# Patient Record
Sex: MALE
[De-identification: names, ages, dates, MRNs, and addresses within clinical notes are randomized per-mention and may not be internally consistent; named-entity substitution may affect disease eponyms.]

---

## 2021-01-10 ENCOUNTER — NURSE TRIAGE (OUTPATIENT)
Dept: OTHER | Facility: CLINIC | Age: 61
End: 2021-01-10

## 2021-01-10 NOTE — TELEPHONE ENCOUNTER
Reason for Disposition   Fever present > 3 days (72 hours)    Answer Assessment - Initial Assessment Questions  1. COVID-19 DIAGNOSIS: \"Who made your Coronavirus (COVID-19) diagnosis? \" \"Was it confirmed by a positive lab test?\" If not diagnosed by a HCP, ask \"Are there lots of cases (community spread) where you live? \" (See public health department website, if unsure)      Dx by lab test    2. COVID-19 EXPOSURE: \"Was there any known exposure to COVID before the symptoms began? \" CDC Definition of close contact: within 6 feet (2 meters) for a total of 15 minutes or more over a 24-hour period. Yes    3. ONSET: \"When did the COVID-19 symptoms start?\"       1/1/21    4. WORST SYMPTOM: \"What is your worst symptom? \" (e.g., cough, fever, shortness of breath, muscle aches)      Fever    5. COUGH: \"Do you have a cough? \" If so, ask: \"How bad is the cough? \"        Cough    6. FEVER: \"Do you have a fever? \" If so, ask: \"What is your temperature, how was it measured, and when did it start? \"      100.2. Orally    7. RESPIRATORY STATUS: \"Describe your breathing? \" (e.g., shortness of breath, wheezing, unable to speak)       Denies    8. BETTER-SAME-WORSE: [place] you getting better, staying the same or getting worse compared to yesterday? \"  If getting worse, ask, \"In what way? \"      Better    9. HIGH RISK DISEASE: \"Do you have any chronic medical problems? \" (e.g., asthma, heart or lung disease, weak immune system, obesity, etc.)      copd    10. PREGNANCY: \"Is there any chance you are pregnant? \" \"When was your last menstrual period? \"        N/a    11. OTHER SYMPTOMS: \"Do you have any other symptoms? \"  (e.g., chills, fatigue, headache, loss of smell or taste, muscle pain, sore throat; new loss of smell or taste especially support the diagnosis of COVID-19)        All symptoms have resolved    Protocols used: CORONAVIRUS (COVID-19) DIAGNOSED OR SUSPECTED-ADULT-AH    Call received on [facility] [address]      Brief description of triage: See above    Triage indicates for patient to f/u pcp in call in 24 hours    Care advice provided. Recommended using local department of health website for testing locations and up to date information. Caller verbalizes understanding, denies any other questions or concerns; instructed to call back for any new or worsening symptoms.

## 2022-06-07 ENCOUNTER — APPOINTMENT (OUTPATIENT)
Dept: RADIOLOGY | Facility: HOSPITAL | Age: 62
End: 2022-06-07

## 2022-06-07 ENCOUNTER — HOSPITAL ENCOUNTER (EMERGENCY)
Facility: HOSPITAL | Age: 62
Discharge: HOME | End: 2022-06-08
Attending: EMERGENCY MEDICINE

## 2022-06-07 DIAGNOSIS — I48.91 A-FIB (CMS/HCC): ICD-10-CM

## 2022-06-07 DIAGNOSIS — R07.9 CHEST PAIN: ICD-10-CM

## 2022-06-07 DIAGNOSIS — R42 LIGHTHEADEDNESS: ICD-10-CM

## 2022-06-07 DIAGNOSIS — R73.9 HYPERGLYCEMIA: Primary | ICD-10-CM

## 2022-06-07 DIAGNOSIS — E66.9 OBESITY: ICD-10-CM

## 2022-06-07 LAB
ALBUMIN SERPL-MCNC: 4.3 G/DL (ref 3.5–5.3)
ALP SERPL-CCNC: 101 U/L (ref 45–115)
ALT SERPL-CCNC: 17 U/L (ref 7–52)
ANION GAP SERPL CALC-SCNC: 10 MMOL/L (ref 3–11)
AST SERPL-CCNC: 24 U/L
BASOPHILS # BLD AUTO: 0.1 10*3/UL
BASOPHILS NFR BLD AUTO: 1 % (ref 0–2)
BILIRUB SERPL-MCNC: 0.61 MG/DL (ref 0.2–1.4)
BNP SERPL-MCNC: 43 PG/ML (ref 0–100)
BUN SERPL-MCNC: 21 MG/DL (ref 7–25)
CALCIUM ALBUM COR SERPL-MCNC: 9 MG/DL (ref 8.6–10.3)
CALCIUM SERPL-MCNC: 9.2 MG/DL (ref 8.6–10.3)
CHLORIDE SERPL-SCNC: 102 MMOL/L (ref 98–107)
CO2 SERPL-SCNC: 25 MMOL/L (ref 21–32)
CREAT SERPL-MCNC: 0.93 MG/DL (ref 0.7–1.3)
DELTA HIGH SENSITIVITY TROPONIN I, 2 HOUR: 0.2
EOSINOPHIL # BLD AUTO: 0.1 10*3/UL
EOSINOPHIL NFR BLD AUTO: 1 % (ref 0–3)
ERYTHROCYTE [DISTWIDTH] IN BLOOD BY AUTOMATED COUNT: 13.8 % (ref 11.5–15)
GFR SERPL CREATININE-BSD FRML MDRD: 93 ML/MIN/1.73M*2
GLUCOSE SERPL-MCNC: 128 MG/DL (ref 70–105)
HCT VFR BLD AUTO: 40.8 % (ref 38–50)
HGB BLD-MCNC: 14.2 G/DL (ref 13.2–17.2)
LIPASE SERPL-CCNC: 21 U/L (ref 11–82)
LYMPHOCYTES # BLD AUTO: 2.4 10*3/UL
LYMPHOCYTES NFR BLD AUTO: 30 % (ref 15–47)
MAGNESIUM SERPL-MCNC: 1.8 MG/DL (ref 1.8–2.4)
MCH RBC QN AUTO: 30.3 PG (ref 29–34)
MCHC RBC AUTO-ENTMCNC: 34.7 G/DL (ref 32–36)
MCV RBC AUTO: 87.4 FL (ref 82–97)
MONOCYTES # BLD AUTO: 0.9 10*3/UL
MONOCYTES NFR BLD AUTO: 12 % (ref 5–13)
NEUTROPHILS # BLD AUTO: 4.4 10*3/UL
NEUTROPHILS NFR BLD AUTO: 56 % (ref 46–70)
PLATELET # BLD AUTO: 242 10*3/UL (ref 130–350)
PMV BLD AUTO: 9 FL (ref 6.9–10.8)
POTASSIUM SERPL-SCNC: 3.5 MMOL/L (ref 3.5–5.1)
PROT SERPL-MCNC: 7.6 G/DL (ref 6–8.3)
RBC # BLD AUTO: 4.67 10*6/ΜL (ref 4.1–5.8)
SODIUM SERPL-SCNC: 137 MMOL/L (ref 135–145)
TROPONIN I SERPL-MCNC: 3.6 PG/ML
TROPONIN I SERPL-MCNC: 3.8 PG/ML
TSH SERPL DL<=0.05 MIU/L-ACNC: 1.09 UIU/ML (ref 0.34–4.82)
WBC # BLD AUTO: 7.9 10*3/UL (ref 3.7–9.6)

## 2022-06-07 PROCEDURE — 84443 ASSAY THYROID STIM HORMONE: CPT | Performed by: EMERGENCY MEDICINE

## 2022-06-07 PROCEDURE — 84484 ASSAY OF TROPONIN QUANT: CPT | Performed by: EMERGENCY MEDICINE

## 2022-06-07 PROCEDURE — 83690 ASSAY OF LIPASE: CPT | Performed by: EMERGENCY MEDICINE

## 2022-06-07 PROCEDURE — 83735 ASSAY OF MAGNESIUM: CPT | Performed by: EMERGENCY MEDICINE

## 2022-06-07 PROCEDURE — 85025 COMPLETE CBC W/AUTO DIFF WBC: CPT | Performed by: EMERGENCY MEDICINE

## 2022-06-07 PROCEDURE — 2580000300 HC RX 258: Performed by: EMERGENCY MEDICINE

## 2022-06-07 PROCEDURE — 99284 EMERGENCY DEPT VISIT MOD MDM: CPT | Performed by: EMERGENCY MEDICINE

## 2022-06-07 PROCEDURE — 71045 X-RAY EXAM CHEST 1 VIEW: CPT

## 2022-06-07 PROCEDURE — 93005 ELECTROCARDIOGRAM TRACING: CPT

## 2022-06-07 PROCEDURE — 36415 COLL VENOUS BLD VENIPUNCTURE: CPT | Performed by: EMERGENCY MEDICINE

## 2022-06-07 PROCEDURE — 83880 ASSAY OF NATRIURETIC PEPTIDE: CPT | Performed by: EMERGENCY MEDICINE

## 2022-06-07 PROCEDURE — 80053 COMPREHEN METABOLIC PANEL: CPT | Performed by: EMERGENCY MEDICINE

## 2022-06-07 RX ORDER — SODIUM CHLORIDE 9 MG/ML
500 INJECTION, SOLUTION INTRAVENOUS ONCE
Status: COMPLETED | OUTPATIENT
Start: 2022-06-07 | End: 2022-06-07

## 2022-06-07 RX ADMIN — SODIUM CHLORIDE 500 ML: 9 INJECTION, SOLUTION INTRAVENOUS at 21:56

## 2022-06-08 VITALS
RESPIRATION RATE: 7 BRPM | WEIGHT: 284.83 LBS | TEMPERATURE: 97.9 F | HEART RATE: 64 BPM | HEIGHT: 68 IN | OXYGEN SATURATION: 96 % | BODY MASS INDEX: 43.17 KG/M2 | SYSTOLIC BLOOD PRESSURE: 120 MMHG | DIASTOLIC BLOOD PRESSURE: 77 MMHG

## 2022-06-08 PROCEDURE — 96360 HYDRATION IV INFUSION INIT: CPT

## 2022-06-08 NOTE — DISCHARGE INSTRUCTIONS
Drink plenty of fluids.  Follow-up with your primary care physician upon return home.  If symptoms return, seek medical attention at the nearest hospital.

## 2022-06-08 NOTE — ED PROVIDER NOTES
Chest Pain (Pt with complaints of chest pain while climbing stairs. 4/10 pressure pain in the center of his chest. Hx of cardiac disease, Afib, Gastric bypass 6 months ago. )        HPI:  Patient is a 61 y.o. male presenting to the emergency department with 4/10 in severity central chest pain. His symptoms began while walking at [facility] approximately 7 hours ago. His pain is improving and is intermittent, lasting short intervals. He denies any exacerbating or improving factors. He reports associated lightheadedness but denies any shortness of breath or sweating. Patient has a history of cardiac disease, a-fib, and gastric bypass. The patient is traveling here from [place]        No past medical history on file.    No past surgical history on file.    Social History     Socioeconomic History   • Marital status: Single     Spouse name: Not on file   • Number of children: Not on file   • Years of education: Not on file   • Highest education level: Not on file   Occupational History   • Not on file   Tobacco Use   • Smoking status: Not on file   • Smokeless tobacco: Not on file   Substance and Sexual Activity   • Alcohol use: Not on file   • Drug use: Not on file   • Sexual activity: Not on file   Other Topics Concern   • Not on file   Social History Narrative   • Not on file     Social Determinants of Health     Financial Resource Strain: Not on file   Food Insecurity: Not on file   Transportation Needs: Not on file   Physical Activity: Not on file   Stress: Not on file   Social Connections: Not on file   Intimate Partner Violence: Not on file   Housing Stability: Not on file       No family history on file.    Allergies   Allergen Reactions   • Keflex [Cephalexin] Anaphylaxis       No current outpatient medications on file.      ROS:  Constitutional: negative for fever  Eyes: negative for visual changes  ENT: negative for sore throat  Cardiovascular: positive for chest pain  Respiratory: negative for shortness of  breath   GI: negative for abdominal pain  : negative for hematuria  Musculoskeletal: negative for back pain  Neuro: negative for headache, positive for lightheadedness  Hematology: negative for bleeding  Immunology: negative for joint pain      ED Triage Vitals   Temp Heart Rate Resp BP SpO2   06/07/22 2029 06/07/22 2029 06/07/22 2029 06/07/22 2029 06/07/22 2029   36.6 °C (97.9 °F) 81 18 117/70 96 %      Mean BP (mmHg) Temp Source Heart Rate Source Patient Position BP Location   06/07/22 2300 06/07/22 2029 -- -- --   96 Oral         FiO2 (%)       --                    Physical Exam:  Nursing note and vitals reviewed.  Constitutional:  Obese, mildly anxious appearing, pleasant middle-aged male.    Head: Normocephalic and atraumatic. Mucous membranes are moist.   Eyes: Pupils are equal, round, and reactive to light.   Neck: Supple.  Cardiovascular: Regular rate and rhythm without murmur.   Pulmonary/Chest: No respiratory distress.  Clear to auscultation bilaterally.  Abdominal: Soft and nontender. Obese.    Back: No CVA tenderness. No midline tenderness.   Musculoskeletal: Mild pitting edema  Neurological: Alert.   Skin: Skin is warm and dry. No rash noted.   Psychiatric: Normal mood and affect.        Labs:  Labs Reviewed   COMPREHENSIVE METABOLIC PANEL - Abnormal       Result Value    Sodium 137      Potassium 3.5      Chloride 102      CO2 25      Anion Gap 10      BUN 21      Creatinine 0.93      Glucose 128 (*)     Calcium 9.2      AST 24      ALT (SGPT) 17      Alkaline Phosphatase 101      Total Protein 7.6      Albumin 4.3      Total Bilirubin 0.61      Corrected Calcium 9.0      eGFR 93      Narrative:     Calculation based on the 2021 Chronic Kidney Disease Epidemiology Collaboration (CKD-EPI) equation refit without adjustment for race.   LIPASE - Normal    Lipase 21     MAGNESIUM - Normal    Magnesium 1.8     TSH - Normal    TSH 1.088     B-TYPE NATRIURETIC PEPTIDE (BNP) - Normal    BNP 43     HIGH  SENSITIVE TROPONIN I - Normal    hsTnI 0 Hour 3.6     HIGH SENSITIVE TROPONIN I, 2 HOUR - Normal    Delta from 0 Hour 0.2      hsTnI 2 hr 3.8     HIGH SENSITIVE TROPONIN I PANEL (0HR, 1HR, 2HR)    Narrative:     The following orders were created for panel order HS Troponin I Panel (0HR, 1HR, 2HR) Blood, Venous.  Procedure                               Abnormality         Status                     ---------                               -----------         ------                     HS Troponin I[[id number]]                 Normal              Final result               1HR High Sensitive Trop I[[id number]]                                                    2HR High Sensitive Tropon...[[id number]]  Normal              Final result                 Please view results for these tests on the individual orders.   CBC WITH AUTO DIFFERENTIAL    WBC 7.9      RBC 4.67      Hemoglobin 14.2      Hematocrit 40.8      MCV 87.4      MCH 30.3      MCHC 34.7      RDW 13.8      Platelets 242      MPV 9.0      Neutrophils% 56      Lymphocytes% 30      Monocytes% 12      Eosinophils% 1      Basophils% 1      ANC (auto diff) 4.40      Lymphocytes Absolute 2.40      Monocytes Absolute 0.90      Eosinophils Absolute 0.10      Basophils Absolute 0.10     LAVENDER TOP RAINBOW       Imaging:  XR chest portable 1 view   Final Result   IMPRESSION:   Perihilar opacification could be seen with viral disease or bronchitis.             MDM:    The patient is a 60 yo male with a history of CAD and a-fib presenting with intermittent chest pain over the last 9 hours. An ECG and troponin will be obtained. Baseline labs will be ordered. The patient will be observed.     The patient remained stable. He is having no additional chest pain. His CBC, magnesium, TSH, lipase, and BNP were normal. Multiple troponin's  by an hour were normal. His CMP shows glucose of 128 but is otherwise unremarkable. His chest x-ray questions bronchitis. The patient  complains of no cough and will be discharged home. Reasons to return to the ED were discussed at length. Patient verbalizes understanding of this and will now be discharged.        Given   Medications   sodium chloride 0.9 % bolus 500 mL (0 mL intravenous Stopped 6/7/22 0604)         Procedure    ECG 12 lead, Chest Pain    Date/Time: 6/7/2022 9:36 PM  Performed by: Joseph Fermin MD  Authorized by: Joseph Fermin MD     ECG reviewed by ED Physician in the absence of a cardiologist: yes    Comments:      Sinus rhythm rate of 79. Normal axis Normal intervals. Early r-wave transition. No acute ischemic changes.             Clinical Impression:    Final diagnoses:   [R73.9] Hyperglycemia   [R07.9] Chest pain   [I48.91] A-fib (CMS/HCC) (HCC)   [R42] Lightheadedness   [E66.9] Obesity   By signing my name, I, Tsering Fermin, attest that this documentation has been prepared under the direction and in the presence of Dr. Fermin, 6/8/2022, 12:33 AM.        I, Joseph Fermin MD, personally performed the services described in this documentation.     Joseph Fermin MD  06/08/22 0248

## 2024-02-21 ENCOUNTER — HOSPITAL ENCOUNTER (EMERGENCY)
Facility: HOSPITAL | Age: 64
Discharge: HOME | End: 2024-02-21
Payer: MEDICARE

## 2024-02-21 ENCOUNTER — APPOINTMENT (OUTPATIENT)
Dept: RADIOLOGY | Facility: HOSPITAL | Age: 64
End: 2024-02-21
Payer: MEDICARE

## 2024-02-21 VITALS
HEIGHT: 68 IN | DIASTOLIC BLOOD PRESSURE: 101 MMHG | WEIGHT: 264.55 LBS | RESPIRATION RATE: 16 BRPM | TEMPERATURE: 96.8 F | SYSTOLIC BLOOD PRESSURE: 187 MMHG | BODY MASS INDEX: 40.09 KG/M2 | OXYGEN SATURATION: 99 % | HEART RATE: 78 BPM

## 2024-02-21 DIAGNOSIS — R10.9 ABDOMINAL PAIN, UNSPECIFIED ABDOMINAL LOCATION: Primary | ICD-10-CM

## 2024-02-21 LAB
ALBUMIN SERPL BCP-MCNC: 4.3 G/DL (ref 3.4–5)
ALP SERPL-CCNC: 87 U/L (ref 33–136)
ALT SERPL W P-5'-P-CCNC: 13 U/L (ref 10–52)
ANION GAP SERPL CALC-SCNC: 14 MMOL/L (ref 10–20)
APPEARANCE UR: CLEAR
AST SERPL W P-5'-P-CCNC: 30 U/L (ref 9–39)
BASOPHILS # BLD AUTO: 0.04 X10*3/UL (ref 0–0.1)
BASOPHILS NFR BLD AUTO: 0.7 %
BILIRUB DIRECT SERPL-MCNC: 0.1 MG/DL (ref 0–0.3)
BILIRUB SERPL-MCNC: 1 MG/DL (ref 0–1.2)
BILIRUB UR STRIP.AUTO-MCNC: NEGATIVE MG/DL
BUN SERPL-MCNC: 15 MG/DL (ref 6–23)
CALCIUM SERPL-MCNC: 9 MG/DL (ref 8.6–10.3)
CHLORIDE SERPL-SCNC: 102 MMOL/L (ref 98–107)
CO2 SERPL-SCNC: 23 MMOL/L (ref 21–32)
COLOR UR: YELLOW
CREAT SERPL-MCNC: 0.68 MG/DL (ref 0.5–1.3)
EGFRCR SERPLBLD CKD-EPI 2021: >90 ML/MIN/1.73M*2
EOSINOPHIL # BLD AUTO: 0.11 X10*3/UL (ref 0–0.7)
EOSINOPHIL NFR BLD AUTO: 1.9 %
ERYTHROCYTE [DISTWIDTH] IN BLOOD BY AUTOMATED COUNT: 12.3 % (ref 11.5–14.5)
GLUCOSE SERPL-MCNC: 92 MG/DL (ref 74–99)
GLUCOSE UR STRIP.AUTO-MCNC: NEGATIVE MG/DL
HCT VFR BLD AUTO: 47.7 % (ref 41–52)
HGB BLD-MCNC: 16.2 G/DL (ref 13.5–17.5)
HOLD SPECIMEN: NORMAL
IMM GRANULOCYTES # BLD AUTO: 0.03 X10*3/UL (ref 0–0.7)
IMM GRANULOCYTES NFR BLD AUTO: 0.5 % (ref 0–0.9)
KETONES UR STRIP.AUTO-MCNC: ABNORMAL MG/DL
LACTATE SERPL-SCNC: 1 MMOL/L (ref 0.4–2)
LEUKOCYTE ESTERASE UR QL STRIP.AUTO: NEGATIVE
LIPASE SERPL-CCNC: 16 U/L (ref 9–82)
LYMPHOCYTES # BLD AUTO: 1.33 X10*3/UL (ref 1.2–4.8)
LYMPHOCYTES NFR BLD AUTO: 22.9 %
MAGNESIUM SERPL-MCNC: 2.05 MG/DL (ref 1.6–2.4)
MCH RBC QN AUTO: 30.6 PG (ref 26–34)
MCHC RBC AUTO-ENTMCNC: 34 G/DL (ref 32–36)
MCV RBC AUTO: 90 FL (ref 80–100)
MONOCYTES # BLD AUTO: 0.61 X10*3/UL (ref 0.1–1)
MONOCYTES NFR BLD AUTO: 10.5 %
NEUTROPHILS # BLD AUTO: 3.69 X10*3/UL (ref 1.2–7.7)
NEUTROPHILS NFR BLD AUTO: 63.5 %
NITRITE UR QL STRIP.AUTO: NEGATIVE
NRBC BLD-RTO: 0 /100 WBCS (ref 0–0)
PH UR STRIP.AUTO: 7 [PH]
PLATELET # BLD AUTO: 237 X10*3/UL (ref 150–450)
POTASSIUM SERPL-SCNC: 4.9 MMOL/L (ref 3.5–5.3)
POTASSIUM SERPL-SCNC: 6.5 MMOL/L (ref 3.5–5.3)
PROT SERPL-MCNC: 7.7 G/DL (ref 6.4–8.2)
PROT UR STRIP.AUTO-MCNC: NEGATIVE MG/DL
RBC # BLD AUTO: 5.3 X10*6/UL (ref 4.5–5.9)
RBC # UR STRIP.AUTO: NEGATIVE /UL
SODIUM SERPL-SCNC: 132 MMOL/L (ref 136–145)
SP GR UR STRIP.AUTO: 1.02
UROBILINOGEN UR STRIP.AUTO-MCNC: 4 MG/DL
WBC # BLD AUTO: 5.8 X10*3/UL (ref 4.4–11.3)

## 2024-02-21 PROCEDURE — 2500000004 HC RX 250 GENERAL PHARMACY W/ HCPCS (ALT 636 FOR OP/ED): Performed by: PHYSICIAN ASSISTANT

## 2024-02-21 PROCEDURE — 36415 COLL VENOUS BLD VENIPUNCTURE: CPT | Performed by: PHYSICIAN ASSISTANT

## 2024-02-21 PROCEDURE — 74177 CT ABD & PELVIS W/CONTRAST: CPT

## 2024-02-21 PROCEDURE — 83735 ASSAY OF MAGNESIUM: CPT | Performed by: PHYSICIAN ASSISTANT

## 2024-02-21 PROCEDURE — 84132 ASSAY OF SERUM POTASSIUM: CPT | Mod: 59 | Performed by: PHYSICIAN ASSISTANT

## 2024-02-21 PROCEDURE — 74177 CT ABD & PELVIS W/CONTRAST: CPT | Performed by: RADIOLOGY

## 2024-02-21 PROCEDURE — 99285 EMERGENCY DEPT VISIT HI MDM: CPT | Mod: 25

## 2024-02-21 PROCEDURE — 83690 ASSAY OF LIPASE: CPT | Performed by: PHYSICIAN ASSISTANT

## 2024-02-21 PROCEDURE — 2550000001 HC RX 255 CONTRASTS: Performed by: PHYSICIAN ASSISTANT

## 2024-02-21 PROCEDURE — 93975 VASCULAR STUDY: CPT

## 2024-02-21 PROCEDURE — 84075 ASSAY ALKALINE PHOSPHATASE: CPT | Performed by: PHYSICIAN ASSISTANT

## 2024-02-21 PROCEDURE — 81003 URINALYSIS AUTO W/O SCOPE: CPT | Performed by: PHYSICIAN ASSISTANT

## 2024-02-21 PROCEDURE — 96374 THER/PROPH/DIAG INJ IV PUSH: CPT | Mod: 59

## 2024-02-21 PROCEDURE — 83605 ASSAY OF LACTIC ACID: CPT | Performed by: PHYSICIAN ASSISTANT

## 2024-02-21 PROCEDURE — 80048 BASIC METABOLIC PNL TOTAL CA: CPT | Performed by: PHYSICIAN ASSISTANT

## 2024-02-21 PROCEDURE — 85025 COMPLETE CBC W/AUTO DIFF WBC: CPT | Performed by: PHYSICIAN ASSISTANT

## 2024-02-21 RX ORDER — IBUPROFEN 600 MG/1
600 TABLET ORAL EVERY 6 HOURS PRN
Qty: 28 TABLET | Refills: 0 | Status: SHIPPED | OUTPATIENT
Start: 2024-02-21 | End: 2024-02-28

## 2024-02-21 RX ORDER — KETOROLAC TROMETHAMINE 15 MG/ML
15 INJECTION, SOLUTION INTRAMUSCULAR; INTRAVENOUS ONCE
Status: COMPLETED | OUTPATIENT
Start: 2024-02-21 | End: 2024-02-21

## 2024-02-21 RX ADMIN — IOHEXOL 75 ML: 350 INJECTION, SOLUTION INTRAVENOUS at 12:09

## 2024-02-21 RX ADMIN — KETOROLAC TROMETHAMINE 15 MG: 15 INJECTION, SOLUTION INTRAMUSCULAR; INTRAVENOUS at 10:50

## 2024-02-21 ASSESSMENT — LIFESTYLE VARIABLES
EVER FELT BAD OR GUILTY ABOUT YOUR DRINKING: NO
HAVE YOU EVER FELT YOU SHOULD CUT DOWN ON YOUR DRINKING: NO
EVER HAD A DRINK FIRST THING IN THE MORNING TO STEADY YOUR NERVES TO GET RID OF A HANGOVER: NO
HAVE PEOPLE ANNOYED YOU BY CRITICIZING YOUR DRINKING: NO

## 2024-02-21 ASSESSMENT — COLUMBIA-SUICIDE SEVERITY RATING SCALE - C-SSRS
2. HAVE YOU ACTUALLY HAD ANY THOUGHTS OF KILLING YOURSELF?: NO
1. IN THE PAST MONTH, HAVE YOU WISHED YOU WERE DEAD OR WISHED YOU COULD GO TO SLEEP AND NOT WAKE UP?: NO
6. HAVE YOU EVER DONE ANYTHING, STARTED TO DO ANYTHING, OR PREPARED TO DO ANYTHING TO END YOUR LIFE?: NO

## 2024-02-21 ASSESSMENT — PAIN DESCRIPTION - LOCATION: LOCATION: ABDOMEN

## 2024-02-21 ASSESSMENT — PAIN SCALES - GENERAL: PAINLEVEL_OUTOF10: 3

## 2024-02-21 ASSESSMENT — PAIN - FUNCTIONAL ASSESSMENT: PAIN_FUNCTIONAL_ASSESSMENT: 0-10

## 2024-02-21 NOTE — ED PROVIDER NOTES
HPI   Chief Complaint   Patient presents with    Abdominal Pain     Pt c/o abd pain that's starts around his Rt flank and goes all the way across his abd and down to his groin.       Is a 63-year-old male coming in for a few days of right flank pain.  He states it radiates down to the anterior abdomen and then down into the left testicle.  Patient denies any prior symptoms similar to this.  He denies any aggravating or alleviating factors.  He does have a history of Arleen-en-Y procedure but denies any other abdominal surgeries.  Patient has not had any fevers or chills.  He denies any hematemesis or blood in his stool.  Patient states that he feels a pressure to the lower abdomen.  He denies any urinary symptoms including hematuria or dysuria.      History provided by:  Patient                      [place] Coma Scale Score: 15                     Patient History   No past medical history on file.  No past surgical history on file.  No family history on file.  Social History     Tobacco Use    Smoking status: Not on file    Smokeless tobacco: Not on file   Substance Use Topics    Alcohol use: Not on file    Drug use: Not on file       Physical Exam   ED Triage Vitals [02/21/24 1017]   Temperature Heart Rate Respirations BP   36 °C (96.8 °F) 78 16 (!) 187/101      Pulse Ox Temp src Heart Rate Source Patient Position   99 % -- -- --      BP Location FiO2 (%)     -- --       Physical Exam  Vitals and nursing note reviewed.   Constitutional:       General: He is not in acute distress.     Appearance: Normal appearance. He is not toxic-appearing.   HENT:      Head: Normocephalic and atraumatic.      Nose: Nose normal.      Mouth/Throat:      Mouth: Mucous membranes are moist.      Pharynx: Oropharynx is clear.   Eyes:      Extraocular Movements: Extraocular movements intact.      Conjunctiva/sclera: Conjunctivae normal.      Pupils: Pupils are equal, round, and reactive to light.   Cardiovascular:      Rate and Rhythm:  Regular rhythm.      Pulses: Normal pulses.      Heart sounds: Normal heart sounds.   Pulmonary:      Effort: Pulmonary effort is normal. No respiratory distress.      Breath sounds: Normal breath sounds.   Abdominal:      General: Abdomen is flat. Bowel sounds are normal.      Palpations: Abdomen is soft.      Tenderness: There is abdominal tenderness in the right lower quadrant and left lower quadrant.   Musculoskeletal:         General: Normal range of motion.      Cervical back: Normal range of motion and neck supple.   Skin:     General: Skin is warm and dry.      Coloration: Skin is not jaundiced or pale.      Findings: No bruising.   Neurological:      General: No focal deficit present.      Mental Status: He is alert and oriented to person, place, and time. Mental status is at baseline.   Psychiatric:         Mood and Affect: Mood normal.         Behavior: Behavior normal.         ED Course & MDM   Diagnoses as of 02/21/24 1253   Abdominal pain, unspecified abdominal location       Medical Decision Making  Summary:  Medical Decision Making:   Patient presented as described in HPI. Patient case including ROS, PE, and treatment and plan discussed with ED attending if attached as cosigner. Results from labs and or imaging included below if completed. Koko Winchester  is a 63 y.o. coming in for Patient presents with:  Abdominal Pain: Pt c/o abd pain that's starts around his Rt flank and goes all the way across his abd and down to his groin.  .  Due to patient's complaint ultrasound of the testicles were completed as well as CT abdomen pelvis as well as labs.  Lab work originally did show hypokalemia that was hemolyzed.  Redraw was normal.  No other concerning lab abnormalities.  CT showed no other acute concerning findings for patient's current symptoms.  He does have a hydrocele noted on ultrasound however no torsion or other concerns.  He does feel much better.  Patient will be discharged home with ibuprofen and  advised to use for his discomfort as well as following up with a PCP for rather evaluation and assessment.      Disposition is completed with shared decision making with the patient or guardian present with the patient. They were advised to follow up with PCP or recommended provider in 2-3 days for another evaluation and exam. I advised the patient to return or go to closest emergency room immediately if symptoms change, get worse, or new symptoms develop prior to follow up. I explained the plan and treatment course. Patient/guardian is in agreement with plan, treatment course, and follow up and state that they will comply.    Labs Reviewed  BASIC METABOLIC PANEL - Abnormal     Glucose                       92                     Sodium                        132 (*)                Potassium                     6.5 (*)                Chloride                      102                    Bicarbonate                   23                     Anion Gap                     14                     Urea Nitrogen                 15                     Creatinine                    0.68                   eGFR                          >90                    Calcium                       9.0                 URINALYSIS WITH REFLEX CULTURE AND MICROSCOPIC - Abnormal     Color, Urine                  Yellow                 Appearance, Urine             Clear                  Specific Gravity, Urine       1.023                  pH, Urine                     7.0                    Protein, Urine                NEGATIVE                Glucose, Urine                NEGATIVE                Blood, Urine                  NEGATIVE                Ketones, Urine                5 (TRACE) (*)               Bilirubin, Urine              NEGATIVE                Urobilinogen, Urine           4.0 (*)                Nitrite, Urine                NEGATIVE                Leukocyte Esterase, Urine     NEGATIVE             MAGNESIUM - Normal      Magnesium                     2.05                LIPASE - Normal     Lipase                        16                       Narrative: Venipuncture immediately after or during the administration of Metamizole may lead to falsely low results. Testing should be performed immediately prior to Metamizole dosing.  HEPATIC FUNCTION PANEL - Normal     Albumin                       4.3                    Bilirubin, Total              1.0                    Bilirubin, Direct             0.1                    Alkaline Phosphatase          87                     ALT                           13                     AST                           30                     Total Protein                 7.7                 LACTATE - Normal     Lactate                       1.0                      Narrative: Venipuncture immediately after or during the administration of Metamizole may lead to falsely low results. Testing should be performed immediately                prior to Metamizole dosing.  POTASSIUM - Normal     Potassium                     4.9                 CBC WITH AUTO DIFFERENTIAL     WBC                           5.8                    nRBC                          0.0                    RBC                           5.30                   Hemoglobin                    16.2                   Hematocrit                    47.7                   MCV                           90                     MCH                           30.6                   MCHC                          34.0                   RDW                           12.3                   Platelets                     237                    Neutrophils %                 63.5                   Immature Granulocytes %, Automated   0.5                    Lymphocytes %                 22.9                   Monocytes %                   10.5                   Eosinophils %                 1.9                    Basophils %                   0.7                     Neutrophils Absolute          3.69                   Immature Granulocytes Absolute, Au*   0.03                   Lymphocytes Absolute          1.33                   Monocytes Absolute            0.61                   Eosinophils Absolute          0.11                   Basophils Absolute            0.04                URINALYSIS WITH REFLEX CULTURE AND MICROSCOPIC       Narrative: The following orders were created for panel order Urinalysis with Reflex Culture and Microscopic.                Procedure                               Abnormality         Status                                   ---------                               -----------         ------                                   Urinalysis with Reflex Cu...[[id number]]  Abnormal            Final result                             Extra Urine Gray Tube[[id number]]                             In process                                               Please view results for these tests on the individual orders.  EXTRA URINE GRAY TUBE   CT abdomen pelvis w IV contrast   Final Result    No acute findings of the abdomen or pelvis.          MACRO:    None.          Signed by: Sergio Mckeon 2/21/2024 12:30 PM    Dictation workstation:   [id number]     US scrotum w doppler   Final Result    1. No acute scrotal pathology.    2. Small left-sided hydrocele with internal debris likely chronic.          MACRO:    None          Signed by: Kishore Dejesus 2/21/2024 11:25 AM    Dictation workstation:   [id number]         Tests/Medications/Escalations of Care considered but not given: As in MDM    Patient care discussed with: N/A  Social Determinants affecting care: N/A    Final diagnosis and disposition as documented     Diagnoses as of 02/21/24 1253  Abdominal pain, unspecified abdominal location       Shared decision making was completed and determined that patient will be discharged. I discussed the differential; results and discharge plan with the patient and/or  family/friend/caregiver if present.  I emphasized the importance of follow-up with the physician I referred them to in the timeframe recommended.  I explained reasons for the patient to return to the Emergency Department. They agreed that if they feel their condition is worsening or if they have any other concern they should call 911 immediately for further assistance. I gave the patient an opportunity to ask all questions they had and answered all of them accordingly. They understand return precautions and discharge instructions. The patient and/or family/friend/caregiver expressed understanding verbally and that they would comply.     Disposition: Discharge      This note has been transcribed using voice recognition and may contain grammatical errors, misplaced words, incorrect words, incorrect phrases or other errors.         Procedure  Procedures     Aureliano Mendez PA-C  02/21/24 1254       Aureliano Mendez PA-C  03/06/24 1744

## 2024-02-21 NOTE — ED TRIAGE NOTES
Pt c/o abd pain that's starts around his Rt flank and goes all the way across his abd and down to his groin.

## 2024-05-05 ENCOUNTER — APPOINTMENT (OUTPATIENT)
Dept: CARDIOLOGY | Facility: HOSPITAL | Age: 64
End: 2024-05-05
Payer: MEDICARE

## 2024-05-05 ENCOUNTER — APPOINTMENT (OUTPATIENT)
Dept: RADIOLOGY | Facility: HOSPITAL | Age: 64
End: 2024-05-05
Payer: MEDICARE

## 2024-05-05 ENCOUNTER — HOSPITAL ENCOUNTER (OUTPATIENT)
Facility: HOSPITAL | Age: 64
Setting detail: OBSERVATION
Discharge: HOME | End: 2024-05-06
Attending: STUDENT IN AN ORGANIZED HEALTH CARE EDUCATION/TRAINING PROGRAM | Admitting: INTERNAL MEDICINE
Payer: MEDICARE

## 2024-05-05 DIAGNOSIS — I20.0 ANGINA PECTORIS, UNSTABLE (MULTI): ICD-10-CM

## 2024-05-05 DIAGNOSIS — E11.69 TYPE 2 DIABETES MELLITUS WITH OTHER SPECIFIED COMPLICATION, UNSPECIFIED WHETHER LONG TERM INSULIN USE (MULTI): ICD-10-CM

## 2024-05-05 DIAGNOSIS — R07.9 CHEST PAIN: ICD-10-CM

## 2024-05-05 DIAGNOSIS — I15.9 SECONDARY HYPERTENSION: ICD-10-CM

## 2024-05-05 DIAGNOSIS — R07.89 ATYPICAL CHEST PAIN: ICD-10-CM

## 2024-05-05 DIAGNOSIS — I20.9 ANGINA PECTORIS, UNSPECIFIED (CMS-HCC): ICD-10-CM

## 2024-05-05 DIAGNOSIS — R07.9 CHEST PAIN, UNSPECIFIED TYPE: Primary | ICD-10-CM

## 2024-05-05 LAB
ALBUMIN SERPL BCP-MCNC: 5 G/DL (ref 3.4–5)
ALP SERPL-CCNC: 103 U/L (ref 33–136)
ALT SERPL W P-5'-P-CCNC: 14 U/L (ref 10–52)
ANION GAP SERPL CALC-SCNC: 14 MMOL/L (ref 10–20)
ANION GAP SERPL CALC-SCNC: 17 MMOL/L (ref 10–20)
AST SERPL W P-5'-P-CCNC: 19 U/L (ref 9–39)
BASOPHILS # BLD AUTO: 0.07 X10*3/UL (ref 0–0.1)
BASOPHILS NFR BLD AUTO: 0.8 %
BILIRUB SERPL-MCNC: 1 MG/DL (ref 0–1.2)
BUN SERPL-MCNC: 17 MG/DL (ref 6–23)
BUN SERPL-MCNC: 18 MG/DL (ref 6–23)
CALCIUM SERPL-MCNC: 9.1 MG/DL (ref 8.6–10.3)
CALCIUM SERPL-MCNC: 9.7 MG/DL (ref 8.6–10.3)
CARDIAC TROPONIN I PNL SERPL HS: 4 NG/L (ref 0–20)
CARDIAC TROPONIN I PNL SERPL HS: 5 NG/L (ref 0–20)
CARDIAC TROPONIN I PNL SERPL HS: 7 NG/L (ref 0–20)
CHLORIDE SERPL-SCNC: 101 MMOL/L (ref 98–107)
CHLORIDE SERPL-SCNC: 102 MMOL/L (ref 98–107)
CHOLEST SERPL-MCNC: 115 MG/DL (ref 0–199)
CHOLESTEROL/HDL RATIO: 2.7
CO2 SERPL-SCNC: 25 MMOL/L (ref 21–32)
CO2 SERPL-SCNC: 27 MMOL/L (ref 21–32)
CREAT SERPL-MCNC: 0.87 MG/DL (ref 0.5–1.3)
CREAT SERPL-MCNC: 0.96 MG/DL (ref 0.5–1.3)
EGFRCR SERPLBLD CKD-EPI 2021: 88 ML/MIN/1.73M*2
EGFRCR SERPLBLD CKD-EPI 2021: >90 ML/MIN/1.73M*2
EOSINOPHIL # BLD AUTO: 0.26 X10*3/UL (ref 0–0.7)
EOSINOPHIL NFR BLD AUTO: 2.9 %
ERYTHROCYTE [DISTWIDTH] IN BLOOD BY AUTOMATED COUNT: 12.7 % (ref 11.5–14.5)
GLUCOSE BLD MANUAL STRIP-MCNC: 103 MG/DL (ref 74–99)
GLUCOSE BLD MANUAL STRIP-MCNC: 103 MG/DL (ref 74–99)
GLUCOSE BLD MANUAL STRIP-MCNC: 108 MG/DL (ref 74–99)
GLUCOSE BLD MANUAL STRIP-MCNC: 123 MG/DL (ref 74–99)
GLUCOSE SERPL-MCNC: 115 MG/DL (ref 74–99)
GLUCOSE SERPL-MCNC: 97 MG/DL (ref 74–99)
HCT VFR BLD AUTO: 47.6 % (ref 41–52)
HDLC SERPL-MCNC: 42 MG/DL
HGB BLD-MCNC: 16.2 G/DL (ref 13.5–17.5)
HOLD SPECIMEN: NORMAL
IMM GRANULOCYTES # BLD AUTO: 0.03 X10*3/UL (ref 0–0.7)
IMM GRANULOCYTES NFR BLD AUTO: 0.3 % (ref 0–0.9)
LDLC SERPL CALC-MCNC: 64 MG/DL
LYMPHOCYTES # BLD AUTO: 3.64 X10*3/UL (ref 1.2–4.8)
LYMPHOCYTES NFR BLD AUTO: 40.6 %
MAGNESIUM SERPL-MCNC: 2.09 MG/DL (ref 1.6–2.4)
MCH RBC QN AUTO: 30.8 PG (ref 26–34)
MCHC RBC AUTO-ENTMCNC: 34 G/DL (ref 32–36)
MCV RBC AUTO: 91 FL (ref 80–100)
MONOCYTES # BLD AUTO: 1.02 X10*3/UL (ref 0.1–1)
MONOCYTES NFR BLD AUTO: 11.4 %
NEUTROPHILS # BLD AUTO: 3.95 X10*3/UL (ref 1.2–7.7)
NEUTROPHILS NFR BLD AUTO: 44 %
NON HDL CHOLESTEROL: 73 MG/DL (ref 0–149)
NRBC BLD-RTO: 0 /100 WBCS (ref 0–0)
PLATELET # BLD AUTO: 266 X10*3/UL (ref 150–450)
PMV BLD AUTO: 9.8 FL (ref 7.5–11.5)
POTASSIUM SERPL-SCNC: 3.4 MMOL/L (ref 3.5–5.3)
POTASSIUM SERPL-SCNC: 4.2 MMOL/L (ref 3.5–5.3)
PROT SERPL-MCNC: 8.4 G/DL (ref 6.4–8.2)
RBC # BLD AUTO: 5.26 X10*6/UL (ref 4.5–5.9)
SODIUM SERPL-SCNC: 139 MMOL/L (ref 136–145)
SODIUM SERPL-SCNC: 140 MMOL/L (ref 136–145)
TRIGL SERPL-MCNC: 43 MG/DL (ref 0–149)
VLDL: 9 MG/DL (ref 0–40)
WBC # BLD AUTO: 9 X10*3/UL (ref 4.4–11.3)

## 2024-05-05 PROCEDURE — 2500000001 HC RX 250 WO HCPCS SELF ADMINISTERED DRUGS (ALT 637 FOR MEDICARE OP): Performed by: NURSE PRACTITIONER

## 2024-05-05 PROCEDURE — 36415 COLL VENOUS BLD VENIPUNCTURE: CPT | Performed by: STUDENT IN AN ORGANIZED HEALTH CARE EDUCATION/TRAINING PROGRAM

## 2024-05-05 PROCEDURE — 82947 ASSAY GLUCOSE BLOOD QUANT: CPT

## 2024-05-05 PROCEDURE — 96372 THER/PROPH/DIAG INJ SC/IM: CPT | Performed by: NURSE PRACTITIONER

## 2024-05-05 PROCEDURE — 84484 ASSAY OF TROPONIN QUANT: CPT | Performed by: NURSE PRACTITIONER

## 2024-05-05 PROCEDURE — 80048 BASIC METABOLIC PNL TOTAL CA: CPT | Mod: CCI | Performed by: STUDENT IN AN ORGANIZED HEALTH CARE EDUCATION/TRAINING PROGRAM

## 2024-05-05 PROCEDURE — 2500000006 HC RX 250 W HCPCS SELF ADMINISTERED DRUGS (ALT 637 FOR ALL PAYERS): Performed by: STUDENT IN AN ORGANIZED HEALTH CARE EDUCATION/TRAINING PROGRAM

## 2024-05-05 PROCEDURE — 84075 ASSAY ALKALINE PHOSPHATASE: CPT | Performed by: STUDENT IN AN ORGANIZED HEALTH CARE EDUCATION/TRAINING PROGRAM

## 2024-05-05 PROCEDURE — 71045 X-RAY EXAM CHEST 1 VIEW: CPT

## 2024-05-05 PROCEDURE — G0378 HOSPITAL OBSERVATION PER HR: HCPCS

## 2024-05-05 PROCEDURE — 2500000004 HC RX 250 GENERAL PHARMACY W/ HCPCS (ALT 636 FOR OP/ED): Performed by: NURSE PRACTITIONER

## 2024-05-05 PROCEDURE — 85025 COMPLETE CBC W/AUTO DIFF WBC: CPT | Performed by: STUDENT IN AN ORGANIZED HEALTH CARE EDUCATION/TRAINING PROGRAM

## 2024-05-05 PROCEDURE — 99223 1ST HOSP IP/OBS HIGH 75: CPT | Performed by: INTERNAL MEDICINE

## 2024-05-05 PROCEDURE — 71045 X-RAY EXAM CHEST 1 VIEW: CPT | Mod: FOREIGN READ | Performed by: RADIOLOGY

## 2024-05-05 PROCEDURE — 2500000001 HC RX 250 WO HCPCS SELF ADMINISTERED DRUGS (ALT 637 FOR MEDICARE OP): Performed by: STUDENT IN AN ORGANIZED HEALTH CARE EDUCATION/TRAINING PROGRAM

## 2024-05-05 PROCEDURE — 93005 ELECTROCARDIOGRAM TRACING: CPT

## 2024-05-05 PROCEDURE — 99223 1ST HOSP IP/OBS HIGH 75: CPT | Performed by: NURSE PRACTITIONER

## 2024-05-05 PROCEDURE — 84484 ASSAY OF TROPONIN QUANT: CPT | Performed by: STUDENT IN AN ORGANIZED HEALTH CARE EDUCATION/TRAINING PROGRAM

## 2024-05-05 PROCEDURE — 2500000005 HC RX 250 GENERAL PHARMACY W/O HCPCS: Performed by: NURSE PRACTITIONER

## 2024-05-05 PROCEDURE — 94660 CPAP INITIATION&MGMT: CPT

## 2024-05-05 PROCEDURE — 83735 ASSAY OF MAGNESIUM: CPT | Performed by: STUDENT IN AN ORGANIZED HEALTH CARE EDUCATION/TRAINING PROGRAM

## 2024-05-05 PROCEDURE — 99285 EMERGENCY DEPT VISIT HI MDM: CPT | Mod: 25

## 2024-05-05 PROCEDURE — 80061 LIPID PANEL: CPT | Performed by: NURSE PRACTITIONER

## 2024-05-05 RX ORDER — LISINOPRIL 20 MG/1
20 TABLET ORAL DAILY
Status: DISCONTINUED | OUTPATIENT
Start: 2024-05-05 | End: 2024-05-06 | Stop reason: HOSPADM

## 2024-05-05 RX ORDER — ATORVASTATIN CALCIUM 80 MG/1
80 TABLET, FILM COATED ORAL DAILY
Status: DISCONTINUED | OUTPATIENT
Start: 2024-05-05 | End: 2024-05-06 | Stop reason: HOSPADM

## 2024-05-05 RX ORDER — INSULIN LISPRO 100 [IU]/ML
0-10 INJECTION, SOLUTION INTRAVENOUS; SUBCUTANEOUS
Status: DISCONTINUED | OUTPATIENT
Start: 2024-05-05 | End: 2024-05-06 | Stop reason: HOSPADM

## 2024-05-05 RX ORDER — NITROGLYCERIN 0.4 MG/1
TABLET SUBLINGUAL
Status: DISPENSED
Start: 2024-05-05 | End: 2024-05-05

## 2024-05-05 RX ORDER — PAROXETINE HYDROCHLORIDE 20 MG/1
20 TABLET, FILM COATED ORAL EVERY MORNING
Status: DISCONTINUED | OUTPATIENT
Start: 2024-05-05 | End: 2024-05-06 | Stop reason: HOSPADM

## 2024-05-05 RX ORDER — FUROSEMIDE 40 MG/1
40 TABLET ORAL 2 TIMES DAILY
COMMUNITY

## 2024-05-05 RX ORDER — METOPROLOL TARTRATE 25 MG/1
25 TABLET, FILM COATED ORAL 2 TIMES DAILY
Status: DISCONTINUED | OUTPATIENT
Start: 2024-05-05 | End: 2024-05-06 | Stop reason: HOSPADM

## 2024-05-05 RX ORDER — SPIRONOLACTONE 25 MG/1
25 TABLET ORAL DAILY
Status: DISCONTINUED | OUTPATIENT
Start: 2024-05-05 | End: 2024-05-06 | Stop reason: HOSPADM

## 2024-05-05 RX ORDER — ATORVASTATIN CALCIUM 80 MG/1
80 TABLET, FILM COATED ORAL NIGHTLY
Status: DISCONTINUED | OUTPATIENT
Start: 2024-05-05 | End: 2024-05-06 | Stop reason: HOSPADM

## 2024-05-05 RX ORDER — METOPROLOL TARTRATE 25 MG/1
25 TABLET, FILM COATED ORAL 2 TIMES DAILY
COMMUNITY
End: 2024-05-16 | Stop reason: WASHOUT

## 2024-05-05 RX ORDER — FUROSEMIDE 40 MG/1
40 TABLET ORAL 2 TIMES DAILY
Status: DISCONTINUED | OUTPATIENT
Start: 2024-05-05 | End: 2024-05-06 | Stop reason: HOSPADM

## 2024-05-05 RX ORDER — METFORMIN HYDROCHLORIDE 500 MG/1
500 TABLET ORAL
Status: DISCONTINUED | OUTPATIENT
Start: 2024-05-05 | End: 2024-05-06 | Stop reason: HOSPADM

## 2024-05-05 RX ORDER — MORPHINE SULFATE 2 MG/ML
1 INJECTION, SOLUTION INTRAMUSCULAR; INTRAVENOUS EVERY 4 HOURS PRN
Status: DISCONTINUED | OUTPATIENT
Start: 2024-05-05 | End: 2024-05-06 | Stop reason: HOSPADM

## 2024-05-05 RX ORDER — DEXTROSE 50 % IN WATER (D50W) INTRAVENOUS SYRINGE
25
Status: DISCONTINUED | OUTPATIENT
Start: 2024-05-05 | End: 2024-05-06 | Stop reason: HOSPADM

## 2024-05-05 RX ORDER — POTASSIUM CHLORIDE 1.5 G/1.58G
20 POWDER, FOR SOLUTION ORAL 2 TIMES DAILY
Status: DISCONTINUED | OUTPATIENT
Start: 2024-05-05 | End: 2024-05-06 | Stop reason: HOSPADM

## 2024-05-05 RX ORDER — METFORMIN HYDROCHLORIDE 500 MG/1
500 TABLET ORAL
Status: ON HOLD | COMMUNITY
End: 2024-05-06

## 2024-05-05 RX ORDER — PAROXETINE HYDROCHLORIDE 20 MG/1
20 TABLET, FILM COATED ORAL EVERY MORNING
COMMUNITY

## 2024-05-05 RX ORDER — DEXTROSE 50 % IN WATER (D50W) INTRAVENOUS SYRINGE
12.5
Status: DISCONTINUED | OUTPATIENT
Start: 2024-05-05 | End: 2024-05-06 | Stop reason: HOSPADM

## 2024-05-05 RX ORDER — ATORVASTATIN CALCIUM 80 MG/1
80 TABLET, FILM COATED ORAL NIGHTLY
COMMUNITY

## 2024-05-05 RX ORDER — MORPHINE SULFATE 2 MG/ML
2 INJECTION, SOLUTION INTRAMUSCULAR; INTRAVENOUS EVERY 4 HOURS PRN
Status: DISCONTINUED | OUTPATIENT
Start: 2024-05-05 | End: 2024-05-06 | Stop reason: HOSPADM

## 2024-05-05 RX ORDER — NITROGLYCERIN 0.4 MG/1
0.4 TABLET SUBLINGUAL ONCE
Status: COMPLETED | OUTPATIENT
Start: 2024-05-05 | End: 2024-05-05

## 2024-05-05 RX ORDER — ASPIRIN 81 MG/1
81 TABLET ORAL DAILY
COMMUNITY

## 2024-05-05 RX ORDER — SPIRONOLACTONE 25 MG/1
25 TABLET ORAL DAILY
COMMUNITY
End: 2024-05-16 | Stop reason: SDUPTHER

## 2024-05-05 RX ORDER — LISINOPRIL 20 MG/1
20 TABLET ORAL DAILY
Status: ON HOLD | COMMUNITY
End: 2024-05-06

## 2024-05-05 RX ORDER — NAPROXEN SODIUM 220 MG/1
324 TABLET, FILM COATED ORAL ONCE
Status: COMPLETED | OUTPATIENT
Start: 2024-05-05 | End: 2024-05-05

## 2024-05-05 RX ORDER — NAPROXEN SODIUM 220 MG/1
81 TABLET, FILM COATED ORAL DAILY
Status: DISCONTINUED | OUTPATIENT
Start: 2024-05-05 | End: 2024-05-06 | Stop reason: HOSPADM

## 2024-05-05 RX ORDER — NAPROXEN SODIUM 220 MG/1
TABLET, FILM COATED ORAL
Status: DISPENSED
Start: 2024-05-05 | End: 2024-05-05

## 2024-05-05 RX ORDER — ENOXAPARIN SODIUM 100 MG/ML
40 INJECTION SUBCUTANEOUS EVERY 12 HOURS SCHEDULED
Status: DISCONTINUED | OUTPATIENT
Start: 2024-05-05 | End: 2024-05-06

## 2024-05-05 RX ADMIN — METOPROLOL TARTRATE 25 MG: 25 TABLET, FILM COATED ORAL at 13:35

## 2024-05-05 RX ADMIN — LISINOPRIL 20 MG: 20 TABLET ORAL at 13:35

## 2024-05-05 RX ADMIN — FUROSEMIDE 40 MG: 40 TABLET ORAL at 13:35

## 2024-05-05 RX ADMIN — ASPIRIN 81 MG 81 MG: 81 TABLET ORAL at 08:51

## 2024-05-05 RX ADMIN — POTASSIUM CHLORIDE 20 MEQ: 1.5 POWDER, FOR SOLUTION ORAL at 04:48

## 2024-05-05 RX ADMIN — ENOXAPARIN SODIUM 40 MG: 40 INJECTION SUBCUTANEOUS at 21:12

## 2024-05-05 RX ADMIN — ATORVASTATIN CALCIUM 80 MG: 80 TABLET, FILM COATED ORAL at 21:12

## 2024-05-05 RX ADMIN — METOPROLOL TARTRATE 25 MG: 25 TABLET, FILM COATED ORAL at 21:12

## 2024-05-05 RX ADMIN — NITROGLYCERIN 0.4 MG: 0.4 TABLET SUBLINGUAL at 02:56

## 2024-05-05 RX ADMIN — Medication 1 L/MIN: at 06:30

## 2024-05-05 RX ADMIN — Medication 1 L/MIN: at 08:00

## 2024-05-05 RX ADMIN — SPIRONOLACTONE 25 MG: 25 TABLET ORAL at 13:35

## 2024-05-05 RX ADMIN — ENOXAPARIN SODIUM 40 MG: 40 INJECTION SUBCUTANEOUS at 08:51

## 2024-05-05 RX ADMIN — POTASSIUM CHLORIDE 20 MEQ: 1.5 POWDER, FOR SOLUTION ORAL at 21:12

## 2024-05-05 RX ADMIN — PAROXETINE 20 MG: 20 TABLET, FILM COATED ORAL at 13:35

## 2024-05-05 RX ADMIN — ASPIRIN 81 MG 324 MG: 81 TABLET ORAL at 02:56

## 2024-05-05 RX ADMIN — FUROSEMIDE 40 MG: 40 TABLET ORAL at 21:12

## 2024-05-05 SDOH — SOCIAL STABILITY: SOCIAL INSECURITY: ARE THERE ANY APPARENT SIGNS OF INJURIES/BEHAVIORS THAT COULD BE RELATED TO ABUSE/NEGLECT?: NO

## 2024-05-05 SDOH — SOCIAL STABILITY: SOCIAL INSECURITY: DOES ANYONE TRY TO KEEP YOU FROM HAVING/CONTACTING OTHER FRIENDS OR DOING THINGS OUTSIDE YOUR HOME?: NO

## 2024-05-05 SDOH — SOCIAL STABILITY: SOCIAL INSECURITY: HAS ANYONE EVER THREATENED TO HURT YOUR FAMILY OR YOUR PETS?: NO

## 2024-05-05 SDOH — ECONOMIC STABILITY: HOUSING INSECURITY
IN THE LAST 12 MONTHS, WAS THERE A TIME WHEN YOU DID NOT HAVE A STEADY PLACE TO SLEEP OR SLEPT IN A SHELTER (INCLUDING NOW)?: NO

## 2024-05-05 SDOH — SOCIAL STABILITY: SOCIAL INSECURITY: ARE YOU OR HAVE YOU BEEN THREATENED OR ABUSED PHYSICALLY, EMOTIONALLY, OR SEXUALLY BY ANYONE?: NO

## 2024-05-05 SDOH — SOCIAL STABILITY: SOCIAL INSECURITY: DO YOU FEEL UNSAFE GOING BACK TO THE PLACE WHERE YOU ARE LIVING?: NO

## 2024-05-05 SDOH — SOCIAL STABILITY: SOCIAL INSECURITY: HAVE YOU HAD THOUGHTS OF HARMING ANYONE ELSE?: NO

## 2024-05-05 SDOH — SOCIAL STABILITY: SOCIAL INSECURITY: HAVE YOU HAD ANY THOUGHTS OF HARMING ANYONE ELSE?: NO

## 2024-05-05 SDOH — SOCIAL STABILITY: SOCIAL INSECURITY: DO YOU FEEL ANYONE HAS EXPLOITED OR TAKEN ADVANTAGE OF YOU FINANCIALLY OR OF YOUR PERSONAL PROPERTY?: NO

## 2024-05-05 SDOH — SOCIAL STABILITY: SOCIAL INSECURITY: ABUSE: ADULT

## 2024-05-05 SDOH — ECONOMIC STABILITY: TRANSPORTATION INSECURITY
IN THE PAST 12 MONTHS, HAS THE LACK OF TRANSPORTATION KEPT YOU FROM MEDICAL APPOINTMENTS OR FROM GETTING MEDICATIONS?: NO

## 2024-05-05 SDOH — ECONOMIC STABILITY: INCOME INSECURITY: IN THE LAST 12 MONTHS, WAS THERE A TIME WHEN YOU WERE NOT ABLE TO PAY THE MORTGAGE OR RENT ON TIME?: NO

## 2024-05-05 SDOH — ECONOMIC STABILITY: HOUSING INSECURITY: IN THE LAST 12 MONTHS, HOW MANY PLACES HAVE YOU LIVED?: 1

## 2024-05-05 SDOH — SOCIAL STABILITY: SOCIAL INSECURITY: WERE YOU ABLE TO COMPLETE ALL THE BEHAVIORAL HEALTH SCREENINGS?: YES

## 2024-05-05 SDOH — ECONOMIC STABILITY: TRANSPORTATION INSECURITY
IN THE PAST 12 MONTHS, HAS LACK OF TRANSPORTATION KEPT YOU FROM MEETINGS, WORK, OR FROM GETTING THINGS NEEDED FOR DAILY LIVING?: NO

## 2024-05-05 SDOH — ECONOMIC STABILITY: INCOME INSECURITY: HOW HARD IS IT FOR YOU TO PAY FOR THE VERY BASICS LIKE FOOD, HOUSING, MEDICAL CARE, AND HEATING?: NOT HARD AT ALL

## 2024-05-05 ASSESSMENT — ENCOUNTER SYMPTOMS
EYES NEGATIVE: 1
MUSCULOSKELETAL NEGATIVE: 1
HEMATOLOGIC/LYMPHATIC NEGATIVE: 1
GASTROINTESTINAL NEGATIVE: 1
PSYCHIATRIC NEGATIVE: 1
CONSTITUTIONAL NEGATIVE: 1
NEUROLOGICAL NEGATIVE: 1
ENDOCRINE COMMENTS: PT IS DIABETIC
ALLERGIC/IMMUNOLOGIC NEGATIVE: 1

## 2024-05-05 ASSESSMENT — COGNITIVE AND FUNCTIONAL STATUS - GENERAL
DAILY ACTIVITIY SCORE: 24
MOBILITY SCORE: 24
DAILY ACTIVITIY SCORE: 24
PATIENT BASELINE BEDBOUND: NO
MOBILITY SCORE: 24

## 2024-05-05 ASSESSMENT — PATIENT HEALTH QUESTIONNAIRE - PHQ9
1. LITTLE INTEREST OR PLEASURE IN DOING THINGS: NOT AT ALL
1. LITTLE INTEREST OR PLEASURE IN DOING THINGS: NOT AT ALL
2. FEELING DOWN, DEPRESSED OR HOPELESS: NOT AT ALL
2. FEELING DOWN, DEPRESSED OR HOPELESS: NOT AT ALL
SUM OF ALL RESPONSES TO PHQ9 QUESTIONS 1 & 2: 0
SUM OF ALL RESPONSES TO PHQ9 QUESTIONS 1 & 2: 0

## 2024-05-05 ASSESSMENT — ACTIVITIES OF DAILY LIVING (ADL)
HEARING - RIGHT EAR: FUNCTIONAL
JUDGMENT_ADEQUATE_SAFELY_COMPLETE_DAILY_ACTIVITIES: YES
FEEDING YOURSELF: INDEPENDENT
GROOMING: INDEPENDENT
HEARING - LEFT EAR: FUNCTIONAL
DRESSING YOURSELF: INDEPENDENT
WALKS IN HOME: INDEPENDENT
PATIENT'S MEMORY ADEQUATE TO SAFELY COMPLETE DAILY ACTIVITIES?: YES
ADEQUATE_TO_COMPLETE_ADL: YES
TOILETING: INDEPENDENT
BATHING: INDEPENDENT

## 2024-05-05 ASSESSMENT — LIFESTYLE VARIABLES
TOTAL SCORE: 0
SKIP TO QUESTIONS 9-10: 0
PRESCIPTION_ABUSE_PAST_12_MONTHS: NO
HAVE YOU EVER FELT YOU SHOULD CUT DOWN ON YOUR DRINKING: NO
AUDIT-C TOTAL SCORE: 3
HOW OFTEN DO YOU HAVE 6 OR MORE DRINKS ON ONE OCCASION: LESS THAN MONTHLY
SUBSTANCE_ABUSE_PAST_12_MONTHS: NO
HOW MANY STANDARD DRINKS CONTAINING ALCOHOL DO YOU HAVE ON A TYPICAL DAY: 3 OR 4
EVER FELT BAD OR GUILTY ABOUT YOUR DRINKING: NO
EVER HAD A DRINK FIRST THING IN THE MORNING TO STEADY YOUR NERVES TO GET RID OF A HANGOVER: NO
HOW OFTEN DO YOU HAVE A DRINK CONTAINING ALCOHOL: MONTHLY OR LESS
HAVE PEOPLE ANNOYED YOU BY CRITICIZING YOUR DRINKING: NO
AUDIT-C TOTAL SCORE: 3

## 2024-05-05 ASSESSMENT — PAIN - FUNCTIONAL ASSESSMENT
PAIN_FUNCTIONAL_ASSESSMENT: 0-10
PAIN_FUNCTIONAL_ASSESSMENT: 0-10

## 2024-05-05 ASSESSMENT — COLUMBIA-SUICIDE SEVERITY RATING SCALE - C-SSRS
6. HAVE YOU EVER DONE ANYTHING, STARTED TO DO ANYTHING, OR PREPARED TO DO ANYTHING TO END YOUR LIFE?: NO
1. IN THE PAST MONTH, HAVE YOU WISHED YOU WERE DEAD OR WISHED YOU COULD GO TO SLEEP AND NOT WAKE UP?: NO
2. HAVE YOU ACTUALLY HAD ANY THOUGHTS OF KILLING YOURSELF?: NO

## 2024-05-05 ASSESSMENT — PAIN DESCRIPTION - DESCRIPTORS: DESCRIPTORS: PRESSURE

## 2024-05-05 ASSESSMENT — PAIN SCALES - GENERAL
PAINLEVEL_OUTOF10: 0 - NO PAIN
PAINLEVEL_OUTOF10: 5 - MODERATE PAIN

## 2024-05-05 NOTE — ED PROVIDER NOTES
CC: Chest Pain     HPI:  Patient is a 63-year-old male significant history of atrial fibrillation on anticoagulation as well as heart failure with an EF of 45% on last echocardiogram in 2023 presenting the emergency department chest pain.  At 2 AM it woke him from sleep.  Describes as a mid chest pressure.  Does not radiate.  Causes severe pain.  Patient feels like he is having a heart attack.  Patient continues to say he felt like this was going to be the 1 and thought he was going to die.    Records Reviewed:  Recent available ED and inpatient notes reviewed in EMR.    PMHx/PSHx:  Per HPI.   - has no past medical history on file.  - has no past surgical history on file.  - does not have a problem list on file.    Medications:  Reviewed in EMR. See EMR for complete list of medications and doses.    Allergies:  Keflex [cephalexin]    Social History:  - Tobacco:  reports that he has never smoked. He has never used smokeless tobacco.   - Alcohol:  reports no history of alcohol use.   - Illicit Drugs:  reports no history of drug use.     ROS:  Per HPI.       ???????????????????????????????????????????????????????????????  Triage Vitals:  T 36.2 °C (97.2 °F)  HR 80  BP (!) 172/95  RR 19  O2 100 % None (Room air)    Physical Exam  ???????????????????????????????????????????????????????????????  GEN: in acute distress  HEAD: atraumatic  EYES: PERRL, EOMI  NECK: no JVD  CVS/CHEST: reg rate, nl rhythm  PULM: CTA b/l no wheezes, crackles, or rhonchi   GI: soft, NT/ND, no rebound or guarding   EXT: Mild bilateral LE edema, 2+ periph pulses in bilat radial and DP   NEURO: Awake and alert, moving all extremities equally and spontaneously  SKIN: warm, dry  PSYCH: Anxious    EKG:  Initial EKG at 0238 read by me reviewed by me is normal sinus rhythm at 83 bpm.  Normal axis.  Normal intervals.  Nonspecific ST segment changes without criteria for ST segment elevation.  Repeat EKG is normal sinus rhythm at 78 bpm.  Normal axis.   Normal intervals.  No ST segment elevation or depression.  Follow-up EKG is normal sinus rhythm at 63 bpm.  Normal axis.  Normal intervals.  No ST segment elevation or depression.    Assessment and Plan:  Patient presents to the emergency department with chest pain.  On arrival, patient is initially hemodynamically stable.  Initial EKG is nonischemic.  Given the character of patient's chest pain patient given 324 mg of chewable aspirin.  Patient has a moderate risk heart score. Serial EKGs and troponins will be obtained given the concern for acute coronary syndrome.  Based on patient's history and physical, I do have a lower suspicion for other etiologies of chest pain including aortic dissection, given the character of the pain with equal peripheral pulses in upper and lower extremities.  I do have a lower suspicion for pulmonary embolism given lack of risk factors, no clinical signs of DVT, normal heart rate and is saturating appropriately on room air.  EKG without signs of pericarditis. On exam patient does have bilateral breath sounds.  Low suspicion for pneumothorax.  No history of vomiting and therefore low suspicion for esophageal rupture.  Patient's exam and history does appear concerning for acute coronary syndrome.  Pending initial work-up patient will be offered observation for provocative testing, serial labs and cardiology consultation.  Patient amenable to admission.      ED Course:  Diagnoses as of 05/05/24 0438   Chest pain, unspecified type       Social Determinants Limiting Care:  Insurance issues previously limited access to care.    Disposition:  Admitted     Chika Medina DO      Procedures ? SmartLinks last updated 5/5/2024 4:26 AM        Chika Medina DO  05/05/24 0448

## 2024-05-05 NOTE — CONSULTS
"Consults  History Of Present Illness:    Koko Winchester is a 63 y.o. 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  States last night he woke from sleep at 2am with severe midsternal chest pressure-- felt like an elephant sitting on his chest with associated SOB.  Checked his BP was 185/100,  and decided to go to the ER.  Has felt \"off\" and fatigued recently with chest pain intermittently for 8 months or more.  Troponin 4, 5. BP in /95-->157/98.  Reports compliance with medications.      Last Recorded Vitals:  Vitals:    05/05/24 0236 05/05/24 0423 05/05/24 0600 05/05/24 0700   BP: (!) 172/95 (!) 121/92 146/90 (!) 157/98   BP Location:    Right arm   Patient Position:    Lying   Pulse: 80 66 71 70   Resp: 19 17 18 16   Temp: 36.2 °C (97.2 °F)   36.1 °C (97 °F)   TempSrc: Temporal   Temporal   SpO2: 100% 99% 96% 97%   Weight: 120 kg (265 lb)   121 kg (266 lb 8.6 oz)   Height: 1.727 m (5' 8\")   1.727 m (5' 8\")       Last Labs:  CBC - 5/5/2024:  2:49 AM  9.0 16.2 266    47.6      CMP - 5/5/2024:  2:49 AM  9.7 8.4 19 --- 1.0   _ 5.0 14 103      PTT - No results in last year.  _   _ _     Troponin I, High Sensitivity   Date/Time Value Ref Range Status   05/05/2024 07:21 AM 7 0 - 20 ng/L Final   05/05/2024 03:54 AM 5 0 - 20 ng/L Final   05/05/2024 02:49 AM 4 0 - 20 ng/L Final     Hemoglobin A1C   Date/Time Value Ref Range Status   02/07/2023 05:07 AM 5.2 % Final     Comment:          Diagnosis of Diabetes-Adults   Non-Diabetic: < or = 5.6%   Increased risk for developing diabetes: 5.7-6.4%   Diagnostic of diabetes: > or = 6.5%  .       Monitoring of Diabetes                Age (y)     Therapeutic Goal (%)   Adults:          >18           <7.0   Pediatrics:    13-18           <7.5                   7-12           <8.0                   0- 6            7.5-8.5   American Diabetes " "Association. Diabetes Care 33(S1), Jan 2010.       LDL Calculated   Date/Time Value Ref Range Status   05/05/2024 03:54 AM 64 <=99 mg/dL Final     Comment:                                 Near   Borderline      AGE      Desirable  Optimal    High     High     Very High     0-19 Y     0 - 109     ---    110-129   >/= 130     ----    20-24 Y     0 - 119     ---    120-159   >/= 160     ----      >24 Y     0 -  99   100-129  130-159   160-189     >/=190       VLDL   Date/Time Value Ref Range Status   05/05/2024 03:54 AM 9 0 - 40 mg/dL Final   02/07/2023 05:07 AM 11 0 - 40 mg/dL Final      Last I/O:  No intake/output data recorded.    Past Cardiology Tests (Last 3 Years):  EKG:  NSR, HR 63bpm    Echo:  6/12/2023  CONCLUSIONS:  1. Left ventricular systolic function is mildly decreased.  2. The right atrium is mild to moderately dilated.  3. There is global hypokinesis of the left ventricle with minor regional variations.     2/7/2023  CONCLUSIONS:   1. Left ventricular systolic function is normal with a 55-60% estimated ejection fraction.   2. Spectral Doppler shows an impaired relaxation pattern of left ventricular diastolic filling.   3. There is trace-mild mitral, tricuspid and pulmonic regurgitation.   4. The estimated pulmonary artery pressure is mildly elevated with the RVSP at 37.2 mmHg.    Cath:  Per patient-- C ~2020 at OSH with \"40% blockages\" no h/o PCI    Stress Test:  2/21/2023  Summary:  1. No clinical or electrocardiographic evidence for ischemia at a maximal workload.  2. Nuclear image results are reported separately.  3. The adequate level of stress was achieved.      Past Medical History:  He has no past medical history on file.    Past Surgical History:  He has no past surgical history on file.      Social History:  He reports that he has never smoked. He has never used smokeless tobacco. He reports that he does not drink alcohol and does not use drugs.    Family History:  No family history on " file.     Allergies:  Keflex [cephalexin]    Inpatient Medications:  Scheduled medications   Medication Dose Route Frequency    aspirin  81 mg oral Daily    atorvastatin  80 mg oral Daily    enoxaparin  40 mg subcutaneous q12h NETTA    insulin lispro  0-10 Units subcutaneous TID with meals    [Held by provider] metFORMIN  500 mg oral BID with meals    potassium chloride  20 mEq oral BID     PRN medications   Medication    dextrose    dextrose    glucagon    glucagon    nitroglycerin     Continuous Medications   Medication Dose Last Rate     Outpatient Medications:  No current outpatient medications    Physical Exam:  Constitutional: Pleasant, Awake/Alert/Oriented to person place and time. Anxious   Head: Atraumatic, Normocephalic  Eyes: EOMI. PAULINE  Neck: Enlarged neck circumference, No JVD  Cardiovascular: Regular rate and rhythm, S1, S2. No extra heart sounds or murmurs  Respiratory: Clear to auscultation bilaterally. No wheezing, rales or rhonchi. Good chest wall expansion  Abdomen: Soft, Nontender, Obese. Bowel sounds appreciated  Musculoskeletal: ROM intact. Muscle strength grossly intact upper and lower extremities 5/5.   Neurological: CNII-XII intact. Sensation grossly intact  Extremities: Warm and dry. No acute rashes and lesions  Psychiatric: Appropriate mood and affect       Assessment/Plan   Very pleasant 63 y.o. 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      Assessment:  Unstable angina  Hypertensive urgency    Plan:  -NPO after midnight for LHC  -Likely will need antihypertensive dose adjustment.  Home med rec is pending RN completion.    -Agree with aspirin and HI statin therapy. Patient states he takes lisinopril, metoprolol, spironolactone and lasix BID, however, dosing is unclear.   -Hold apixaban prior to LHC and will look to resume  post cath    Peripheral IV 05/05/24 18 G Distal;Left;Upper;Anterior Arm (Active)   Site Assessment Dry;Intact;Clean 05/05/24 0856   Dressing Type Transparent 05/05/24 0856   Line Status Flushed 05/05/24 0856   Dressing Status Clean;Dry 05/05/24 0856   Number of days: 0       Code Status:  Full Code      Jade Clemens, APRLV-CNP

## 2024-05-05 NOTE — PROGRESS NOTES
05/05/24 1039   Discharge Planning   Living Arrangements Family members  (cousin)   Support Systems Family members   Assistance Needed A&Ox3, independent with ADLs, no DME, drives, room air at baseline; no PCP   Type of Residence Private residence   Home or Post Acute Services None   Patient expects to be discharged to: Home no needs   Does the patient need discharge transport arranged? No     05/05/2024 1040: Spoke to the patient at the bedside. Patient denies any discharge needs and prefers to return home when medically ready.

## 2024-05-05 NOTE — PROGRESS NOTES
Same day rounding:  Still having intermittent chest pressure, troponins negative.  Seen by cardiology, planning left heart cath tomorrow.  Holding metformin and apixaban.  Resumed home cardiac meds.

## 2024-05-05 NOTE — H&P
History Of Present Illness  Koko Winchester is a 63 y.o. male with history of non critical CAD, HTN, HLD, DM type II and heart failure presenting with chest pressure. He says he woke up at around 2 AM with substernal chest pressure radiating to left lateral area of his chest. He felt slightly short of breath but denied having lightheadedness, diaphoresis, palpitations, nausea or vomiting. He drove himself to the ED. His EKG on arrival did not reveal any acute ischemic changes. He received NTG, ASA in ED with relief of his chest discomfort. His serial troponins have been negative so far.      Past Medical History  Type 2 diabetes mellitus  Noncritical coronary artery disease  Hypertension  Hyperlipidemia  Obstructive sleep apnea on nocturnal CPAP  Anxiety  COPD  Pulmonary hypertension  Obesity s/p gastric bypass surgery  Heart failure  Atrial fibrillation    Past Surgical History  Arleen-en-Y gastric bypass surgery     Social History  He reports that he has never smoked. He has never used smokeless tobacco. He reports that he does not drink alcohol and does not use drugs.    Family History  Father: COPD  Mother Emphysema  Brother: Sudden cardiac death  Sister: CAD s/p PCI with stenting and CABG     Allergies  Keflex [cephalexin]    Review of Systems   Constitutional: Negative.    HENT: Negative.     Eyes: Negative.    Respiratory:          See HPI   Cardiovascular:         See HPI   Gastrointestinal: Negative.    Endocrine:        Pt is diabetic    Genitourinary: Negative.    Musculoskeletal: Negative.    Skin: Negative.    Allergic/Immunologic: Negative.    Neurological: Negative.    Hematological: Negative.    Psychiatric/Behavioral: Negative.          Physical Exam  Constitutional:       General: He is not in acute distress.     Appearance: He is obese. He is not ill-appearing, toxic-appearing or diaphoretic.   HENT:      Head: Normocephalic and atraumatic.      Nose: Nose normal.      Mouth/Throat:      Mouth: Mucous  "membranes are moist.      Pharynx: Oropharynx is clear. No oropharyngeal exudate or posterior oropharyngeal erythema.   Eyes:      General: No scleral icterus.        Right eye: No discharge.         Left eye: No discharge.      Conjunctiva/sclera: Conjunctivae normal.   Cardiovascular:      Rate and Rhythm: Normal rate and regular rhythm.      Heart sounds: No murmur heard.  Pulmonary:      Breath sounds: No wheezing, rhonchi or rales.   Abdominal:      General: Bowel sounds are normal.      Palpations: Abdomen is soft.      Tenderness: There is no abdominal tenderness. There is no right CVA tenderness, left CVA tenderness, guarding or rebound.   Musculoskeletal:      Cervical back: Neck supple.      Right lower leg: No edema.      Left lower leg: No edema.   Lymphadenopathy:      Cervical: No cervical adenopathy.   Skin:     General: Skin is warm and dry.      Findings: No lesion or rash.   Neurological:      General: No focal deficit present.      Mental Status: He is alert and oriented to person, place, and time.   Psychiatric:         Mood and Affect: Mood normal.         Behavior: Behavior normal.          Last Recorded Vitals  Blood pressure (!) 121/92, pulse 66, temperature 36.2 °C (97.2 °F), temperature source Temporal, resp. rate 17, height 1.727 m (5' 8\"), weight 120 kg (265 lb), SpO2 99%.    Relevant Results   Latest Reference Range & Units 05/05/24 02:49 05/05/24 03:54   GLUCOSE 74 - 99 mg/dL 115 (H)    SODIUM 136 - 145 mmol/L 140    POTASSIUM 3.5 - 5.3 mmol/L 3.4 (L)    CHLORIDE 98 - 107 mmol/L 101    Bicarbonate 21 - 32 mmol/L 25    Anion Gap 10 - 20 mmol/L 17    Blood Urea Nitrogen 6 - 23 mg/dL 18    Creatinine 0.50 - 1.30 mg/dL 0.96    EGFR >60 mL/min/1.73m*2 88    Calcium 8.6 - 10.3 mg/dL 9.7    Albumin 3.4 - 5.0 g/dL 5.0    Alkaline Phosphatase 33 - 136 U/L 103    ALT 10 - 52 U/L 14    AST 9 - 39 U/L 19    Bilirubin Total 0.0 - 1.2 mg/dL 1.0    Total Protein 6.4 - 8.2 g/dL 8.4 (H)    MAGNESIUM " 1.60 - 2.40 mg/dL 2.09    Troponin I, High Sensitivity 0 - 20 ng/L 4 5   LEUKOCYTES (10*3/UL) IN BLOOD BY AUTOMATED COUNT, Turkish 4.4 - 11.3 x10*3/uL 9.0    nRBC 0.0 - 0.0 /100 WBCs 0.0    ERYTHROCYTES (10*6/UL) IN BLOOD BY AUTOMATED COUNT, Turkish 4.50 - 5.90 x10*6/uL 5.26    HEMOGLOBIN 13.5 - 17.5 g/dL 16.2    HEMATOCRIT 41.0 - 52.0 % 47.6    MCV 80 - 100 fL 91    MCH 26.0 - 34.0 pg 30.8    MCHC 32.0 - 36.0 g/dL 34.0    RED CELL DISTRIBUTION WIDTH 11.5 - 14.5 % 12.7    PLATELETS (10*3/UL) IN BLOOD AUTOMATED COUNT, Turkish 150 - 450 x10*3/uL 266    MEAN PLATELET VOLUME 7.5 - 11.5 fL 9.8    NEUTROPHILS/100 LEUKOCYTES IN BLOOD BY AUTOMATED COUNT, Turkish 40.0 - 80.0 % 44.0    Immature Granulocytes %, Automated 0.0 - 0.9 % 0.3    Lymphocytes % 13.0 - 44.0 % 40.6    Monocytes % 2.0 - 10.0 % 11.4    Eosinophils % 0.0 - 6.0 % 2.9    Basophils % 0.0 - 2.0 % 0.8    NEUTROPHILS (10*3/UL) IN BLOOD BY AUTOMATED COUNT, Turkish 1.20 - 7.70 x10*3/uL 3.95    Immature Granulocytes Absolute, Automated 0.00 - 0.70 x10*3/uL 0.03    Lymphocytes Absolute 1.20 - 4.80 x10*3/uL 3.64    Monocytes Absolute 0.10 - 1.00 x10*3/uL 1.02 (H)    Eosinophils Absolute 0.00 - 0.70 x10*3/uL 0.26    Basophils Absolute 0.00 - 0.10 x10*3/uL 0.07    (H): Data is abnormally high  (L): Data is abnormally low    XR CHEST:     IMPRESSION:  No acute cardiopulmonary process.  Signed by Alex Bruno MD     Assessment/Plan   Chest pain  Has non critical CAD from cardiac cath >1 year ago but has multiple risk factors  Observation to telemetry  Serial troponin levels  Antiplatelet and HI statin therapy  Cardiology to see     Hypokalemia  Replaced in ED  Recheck     Type II DM  Resume metformin  Accu checks and will add ISS coverage as needed    CAITLIN  On nocturnal CPAP 8 cm at night    Hyperlipidemia  Check FLP  HI statin      I spent 60 minutes in the professional and overall care of this patient.      Dami Bedolla MD

## 2024-05-05 NOTE — CARE PLAN
Problem: Pain  Goal: Takes deep breaths with improved pain control throughout the shift  Outcome: Progressing  Goal: Walks with improved pain control throughout the shift  Outcome: Progressing   The patient's goals for the shift include      The clinical goals for the shift include Decrease chest pain

## 2024-05-05 NOTE — CARE PLAN
The patient's goals for the shift include      The clinical goals for the shift include Decrease chest pain

## 2024-05-06 ENCOUNTER — APPOINTMENT (OUTPATIENT)
Dept: CARDIOLOGY | Facility: HOSPITAL | Age: 64
End: 2024-05-06
Payer: MEDICARE

## 2024-05-06 VITALS
WEIGHT: 266.54 LBS | HEIGHT: 68 IN | SYSTOLIC BLOOD PRESSURE: 115 MMHG | DIASTOLIC BLOOD PRESSURE: 75 MMHG | TEMPERATURE: 97.9 F | RESPIRATION RATE: 18 BRPM | HEART RATE: 65 BPM | OXYGEN SATURATION: 97 % | BODY MASS INDEX: 40.4 KG/M2

## 2024-05-06 DIAGNOSIS — R00.2 PALPITATIONS: Primary | ICD-10-CM

## 2024-05-06 PROBLEM — R07.9 CHEST PAIN: Status: RESOLVED | Noted: 2024-05-05 | Resolved: 2024-05-06

## 2024-05-06 PROBLEM — I20.0 ANGINA PECTORIS, UNSTABLE (MULTI): Status: RESOLVED | Noted: 2024-05-05 | Resolved: 2024-05-06

## 2024-05-06 LAB
ALBUMIN SERPL BCP-MCNC: 4.2 G/DL (ref 3.4–5)
ANION GAP SERPL CALC-SCNC: 11 MMOL/L (ref 10–20)
BNP SERPL-MCNC: 82 PG/ML (ref 0–99)
BUN SERPL-MCNC: 16 MG/DL (ref 6–23)
CALCIUM SERPL-MCNC: 9.1 MG/DL (ref 8.6–10.3)
CARDIAC TROPONIN I PNL SERPL HS: 5 NG/L (ref 0–20)
CHLORIDE SERPL-SCNC: 101 MMOL/L (ref 98–107)
CO2 SERPL-SCNC: 29 MMOL/L (ref 21–32)
CREAT SERPL-MCNC: 0.96 MG/DL (ref 0.5–1.3)
EGFRCR SERPLBLD CKD-EPI 2021: 88 ML/MIN/1.73M*2
ERYTHROCYTE [DISTWIDTH] IN BLOOD BY AUTOMATED COUNT: 12.5 % (ref 11.5–14.5)
GLUCOSE SERPL-MCNC: 96 MG/DL (ref 74–99)
HCT VFR BLD AUTO: 47.1 % (ref 41–52)
HGB BLD-MCNC: 15.3 G/DL (ref 13.5–17.5)
MAGNESIUM SERPL-MCNC: 1.92 MG/DL (ref 1.6–2.4)
MCH RBC QN AUTO: 30.4 PG (ref 26–34)
MCHC RBC AUTO-ENTMCNC: 32.5 G/DL (ref 32–36)
MCV RBC AUTO: 94 FL (ref 80–100)
NRBC BLD-RTO: 0 /100 WBCS (ref 0–0)
PHOSPHATE SERPL-MCNC: 4.2 MG/DL (ref 2.5–4.9)
PLATELET # BLD AUTO: 235 X10*3/UL (ref 150–450)
POTASSIUM SERPL-SCNC: 4.1 MMOL/L (ref 3.5–5.3)
RBC # BLD AUTO: 5.04 X10*6/UL (ref 4.5–5.9)
SODIUM SERPL-SCNC: 137 MMOL/L (ref 136–145)
WBC # BLD AUTO: 6.6 X10*3/UL (ref 4.4–11.3)

## 2024-05-06 PROCEDURE — 94660 CPAP INITIATION&MGMT: CPT

## 2024-05-06 PROCEDURE — 94760 N-INVAS EAR/PLS OXIMETRY 1: CPT

## 2024-05-06 PROCEDURE — 2500000006 HC RX 250 W HCPCS SELF ADMINISTERED DRUGS (ALT 637 FOR ALL PAYERS): Performed by: STUDENT IN AN ORGANIZED HEALTH CARE EDUCATION/TRAINING PROGRAM

## 2024-05-06 PROCEDURE — G0378 HOSPITAL OBSERVATION PER HR: HCPCS

## 2024-05-06 PROCEDURE — 2500000001 HC RX 250 WO HCPCS SELF ADMINISTERED DRUGS (ALT 637 FOR MEDICARE OP): Performed by: NURSE PRACTITIONER

## 2024-05-06 PROCEDURE — 99152 MOD SED SAME PHYS/QHP 5/>YRS: CPT | Performed by: INTERNAL MEDICINE

## 2024-05-06 PROCEDURE — 93306 TTE W/DOPPLER COMPLETE: CPT

## 2024-05-06 PROCEDURE — 7100000009 HC PHASE TWO TIME - INITIAL BASE CHARGE: Performed by: INTERNAL MEDICINE

## 2024-05-06 PROCEDURE — 2500000005 HC RX 250 GENERAL PHARMACY W/O HCPCS: Performed by: INTERNAL MEDICINE

## 2024-05-06 PROCEDURE — 80069 RENAL FUNCTION PANEL: CPT | Performed by: STUDENT IN AN ORGANIZED HEALTH CARE EDUCATION/TRAINING PROGRAM

## 2024-05-06 PROCEDURE — 2720000007 HC OR 272 NO HCPCS: Performed by: INTERNAL MEDICINE

## 2024-05-06 PROCEDURE — 93458 L HRT ARTERY/VENTRICLE ANGIO: CPT | Performed by: INTERNAL MEDICINE

## 2024-05-06 PROCEDURE — 7100000010 HC PHASE TWO TIME - EACH INCREMENTAL 1 MINUTE: Performed by: INTERNAL MEDICINE

## 2024-05-06 PROCEDURE — 84484 ASSAY OF TROPONIN QUANT: CPT | Performed by: STUDENT IN AN ORGANIZED HEALTH CARE EDUCATION/TRAINING PROGRAM

## 2024-05-06 PROCEDURE — 36415 COLL VENOUS BLD VENIPUNCTURE: CPT | Performed by: STUDENT IN AN ORGANIZED HEALTH CARE EDUCATION/TRAINING PROGRAM

## 2024-05-06 PROCEDURE — 83735 ASSAY OF MAGNESIUM: CPT | Performed by: STUDENT IN AN ORGANIZED HEALTH CARE EDUCATION/TRAINING PROGRAM

## 2024-05-06 PROCEDURE — 2550000001 HC RX 255 CONTRASTS: Performed by: INTERNAL MEDICINE

## 2024-05-06 PROCEDURE — 2500000004 HC RX 250 GENERAL PHARMACY W/ HCPCS (ALT 636 FOR OP/ED): Performed by: INTERNAL MEDICINE

## 2024-05-06 PROCEDURE — C1894 INTRO/SHEATH, NON-LASER: HCPCS | Performed by: INTERNAL MEDICINE

## 2024-05-06 PROCEDURE — 85027 COMPLETE CBC AUTOMATED: CPT | Performed by: STUDENT IN AN ORGANIZED HEALTH CARE EDUCATION/TRAINING PROGRAM

## 2024-05-06 PROCEDURE — 83880 ASSAY OF NATRIURETIC PEPTIDE: CPT | Performed by: NURSE PRACTITIONER

## 2024-05-06 PROCEDURE — 96373 THER/PROPH/DIAG INJ IA: CPT | Performed by: INTERNAL MEDICINE

## 2024-05-06 PROCEDURE — 93306 TTE W/DOPPLER COMPLETE: CPT | Performed by: STUDENT IN AN ORGANIZED HEALTH CARE EDUCATION/TRAINING PROGRAM

## 2024-05-06 PROCEDURE — 2500000004 HC RX 250 GENERAL PHARMACY W/ HCPCS (ALT 636 FOR OP/ED): Performed by: STUDENT IN AN ORGANIZED HEALTH CARE EDUCATION/TRAINING PROGRAM

## 2024-05-06 RX ORDER — PANTOPRAZOLE SODIUM 40 MG/1
40 TABLET, DELAYED RELEASE ORAL
Qty: 30 TABLET | Refills: 0 | Status: SHIPPED | OUTPATIENT
Start: 2024-05-07 | End: 2024-06-06

## 2024-05-06 RX ORDER — FENTANYL CITRATE 50 UG/ML
INJECTION, SOLUTION INTRAMUSCULAR; INTRAVENOUS AS NEEDED
Status: DISCONTINUED | OUTPATIENT
Start: 2024-05-06 | End: 2024-05-06 | Stop reason: HOSPADM

## 2024-05-06 RX ORDER — LISINOPRIL 20 MG/1
40 TABLET ORAL DAILY
Qty: 60 TABLET | Refills: 0 | Status: SHIPPED | OUTPATIENT
Start: 2024-05-06 | End: 2024-06-05

## 2024-05-06 RX ORDER — VERAPAMIL HYDROCHLORIDE 2.5 MG/ML
INJECTION, SOLUTION INTRAVENOUS AS NEEDED
Status: DISCONTINUED | OUTPATIENT
Start: 2024-05-06 | End: 2024-05-06 | Stop reason: HOSPADM

## 2024-05-06 RX ORDER — HEPARIN SODIUM 1000 [USP'U]/ML
INJECTION, SOLUTION INTRAVENOUS; SUBCUTANEOUS AS NEEDED
Status: DISCONTINUED | OUTPATIENT
Start: 2024-05-06 | End: 2024-05-06 | Stop reason: HOSPADM

## 2024-05-06 RX ORDER — PANTOPRAZOLE SODIUM 40 MG/1
40 TABLET, DELAYED RELEASE ORAL
Qty: 30 TABLET | Refills: 0 | Status: CANCELLED | OUTPATIENT
Start: 2024-05-07 | End: 2024-06-06

## 2024-05-06 RX ORDER — MIDAZOLAM HYDROCHLORIDE 1 MG/ML
INJECTION, SOLUTION INTRAMUSCULAR; INTRAVENOUS AS NEEDED
Status: DISCONTINUED | OUTPATIENT
Start: 2024-05-06 | End: 2024-05-06 | Stop reason: HOSPADM

## 2024-05-06 RX ORDER — NITROGLYCERIN 40 MG/100ML
INJECTION INTRAVENOUS AS NEEDED
Status: DISCONTINUED | OUTPATIENT
Start: 2024-05-06 | End: 2024-05-06 | Stop reason: HOSPADM

## 2024-05-06 RX ORDER — SODIUM CHLORIDE 9 MG/ML
INJECTION, SOLUTION INTRAVENOUS CONTINUOUS PRN
Status: COMPLETED | OUTPATIENT
Start: 2024-05-06 | End: 2024-05-06

## 2024-05-06 RX ORDER — LIDOCAINE HYDROCHLORIDE 20 MG/ML
INJECTION, SOLUTION INFILTRATION; PERINEURAL AS NEEDED
Status: DISCONTINUED | OUTPATIENT
Start: 2024-05-06 | End: 2024-05-06 | Stop reason: HOSPADM

## 2024-05-06 RX ORDER — METFORMIN HYDROCHLORIDE 500 MG/1
500 TABLET ORAL
Start: 2024-05-09 | End: 2024-06-08

## 2024-05-06 RX ORDER — PANTOPRAZOLE SODIUM 40 MG/1
40 TABLET, DELAYED RELEASE ORAL
Status: DISCONTINUED | OUTPATIENT
Start: 2024-05-07 | End: 2024-05-06 | Stop reason: HOSPADM

## 2024-05-06 RX ADMIN — ATORVASTATIN CALCIUM 80 MG: 80 TABLET, FILM COATED ORAL at 12:19

## 2024-05-06 RX ADMIN — PERFLUTREN 1 ML OF DILUTION: 6.52 INJECTION, SUSPENSION INTRAVENOUS at 13:31

## 2024-05-06 RX ADMIN — PAROXETINE 20 MG: 20 TABLET, FILM COATED ORAL at 12:19

## 2024-05-06 ASSESSMENT — PAIN SCALES - GENERAL
PAINLEVEL_OUTOF10: 0 - NO PAIN

## 2024-05-06 ASSESSMENT — PAIN - FUNCTIONAL ASSESSMENT
PAIN_FUNCTIONAL_ASSESSMENT: 0-10

## 2024-05-06 ASSESSMENT — COGNITIVE AND FUNCTIONAL STATUS - GENERAL
MOBILITY SCORE: 24
DAILY ACTIVITIY SCORE: 24

## 2024-05-06 ASSESSMENT — PAIN DESCRIPTION - DESCRIPTORS: DESCRIPTORS: PRESSURE

## 2024-05-06 ASSESSMENT — ACTIVITIES OF DAILY LIVING (ADL): LACK_OF_TRANSPORTATION: NO

## 2024-05-06 NOTE — DISCHARGE SUMMARY
Discharge Diagnosis  Chest pain, atypical    Issues Requiring Follow-Up  Started on PPI  Lisinopril increased to 40 mg daily  Follow up with PCP in 1 week  Cardiology arranged outpt o heart monitor with follow up    Discharge Meds     Your medication list        START taking these medications        Instructions Last Dose Given Next Dose Due   pantoprazole 40 mg EC tablet  Commonly known as: ProtoNix  Start taking on: May 7, 2024      Take 1 tablet (40 mg) by mouth once daily in the morning. Take before meals. Do not crush, chew, or split. Do not fill before May 7, 2024.              CHANGE how you take these medications        Instructions Last Dose Given Next Dose Due   metFORMIN 500 mg tablet  Commonly known as: Glucophage  Start taking on: May 9, 2024  What changed: These instructions start on May 9, 2024. If you are unsure what to do until then, ask your doctor or other care provider.      Take 1 tablet (500 mg) by mouth 2 times a day with meals. Do not fill before May 9, 2024.              CONTINUE taking these medications        Instructions Last Dose Given Next Dose Due   aspirin 81 mg EC tablet           atorvastatin 80 mg tablet  Commonly known as: Lipitor           Eliquis 5 mg tablet  Generic drug: apixaban           furosemide 40 mg tablet  Commonly known as: Lasix           lisinopril 20 mg tablet           metoprolol tartrate 25 mg tablet  Commonly known as: Lopressor           PARoxetine 20 mg tablet  Commonly known as: Paxil           spironolactone 25 mg tablet  Commonly known as: Aldactone                     Where to Get Your Medications        These medications were sent to [facility] [address] [place] [id number] [facility]  [address] [facility] [address]      Phone: [phone]   pantoprazole 40 mg EC tablet       Information about where to get these medications is not yet available    Ask your nurse or doctor about these medications  metFORMIN 500 mg  tablet         Test Results Pending At Discharge  Pending Labs       No current pending labs.            Hospital Course   From [facility]:   Koko Winchester is a 63 y.o. male with history of non critical CAD, HTN, HLD, DM type II and heart failure presenting with chest pressure. He says he woke up at around 2 AM with substernal chest pressure radiating to left lateral area of his chest. He felt slightly short of breath but denied having lightheadedness, diaphoresis, palpitations, nausea or vomiting. He drove himself to the ED. His EKG on arrival did not reveal any acute ischemic changes. He received NTG, ASA in ED with relief of his chest discomfort. His serial troponins have been negative so far.     BP was elevated upon presentation.  He was evaluated by cardiology and left heart cath was done showing Normal coronaries with a mild mid-LAD bridge and cardiology did not feel symptoms were related to that finding.  Rec PPI for home and increased Lisinopril to 40 mg daily.  Echo was done prior to dc, reviewed by cardiology and he was cleared for dc.  Cardiology arranged outpt zio heart monitor with follow up.    On day of discharge, patient denied CP, SOB, n/v/diarrhea/constipation, was tolerating diet and ambulating without issue.  Patient appeared hemodynamically stable at time of discharge. Discussed with attending physician who agreed with discharge plan.    Pertinent Physical Exam At Time of Discharge  Physical Exam  Physical Exam  Gen: NAD  Eyes:  EOM intact  ENT: MMM  Neck: No JVD  Respiratory: CTAB, no wheezes/rhonchi  Cardiac: RRR, no murmurs rubs or gallops  Abdomen: soft, NT, +BS  Extremities: no edema or cyanosis, right wrist splinted  Neuro: No focal deficits, alert and oriented x 3  Psych:  appropriate mood and behavior    Outpatient Follow-Up  No future appointments.  PCP and cardiology    Noemy Jack PA-C

## 2024-05-06 NOTE — PROGRESS NOTES
Cardiology - Daily Progress Note   Hospital Day: 2       Name:Koko Winchester, AGE: 63 y.o., GENDER: male, MRN: [id number], ROOM: 220/220-B   CODE STATUS: Full Code         Subjective:    Overnight events: Nothing acute overnight.    The patient was seen and examined at bedside. Reports that he has had multiple episodes of intense center chest pressure that radiates to his left ribs; once in February of 2023, once in June of 2023, and once again on Saturday. Reports that he measured his BP at 180/100 at that time on Saturday. Also notes that in the past he has been able to feel palpitations when he has entered A-fib with RVR. States it has been years since he last felt that.    Objective:    Vitals:   Vitals:    05/06/24 1100 05/06/24 1115 05/06/24 1130 05/06/24 1140   BP: 111/74 105/66  119/78   BP Location:       Patient Position:       Pulse: 61 62 68 65   Resp: 15 18 20 18   Temp:       TempSrc:       SpO2:       Weight:       Height:            Intake/Output Summary (Last 24 hours) at 5/6/2024 1437  Last data filed at 5/6/2024 0918  Gross per 24 hour   Intake 265.51 ml   Output 0 ml   Net 265.51 ml        Physical Examination:   Physical Exam  Constitutional:       General: He is not in acute distress.     Appearance: Normal appearance. He is not ill-appearing.   HENT:      Head: Normocephalic and atraumatic.      Nose: Nose normal.      Mouth/Throat:      Mouth: Mucous membranes are moist.      Pharynx: Oropharynx is clear.   Eyes:      Conjunctiva/sclera: Conjunctivae normal.   Cardiovascular:      Rate and Rhythm: Normal rate and regular rhythm.      Pulses: Normal pulses.      Heart sounds: Normal heart sounds.   Pulmonary:      Effort: Pulmonary effort is normal.      Breath sounds: Normal breath sounds.   Abdominal:      General: Abdomen is flat.      Palpations: Abdomen is soft.      Tenderness: There is no abdominal tenderness.   Musculoskeletal:      Right lower leg: No edema.      Left lower leg: No  edema.   Skin:     General: Skin is warm and dry.   Neurological:      Mental Status: He is alert.   Psychiatric:         Mood and Affect: Mood normal.         Behavior: Behavior normal.          Labs:   Results from last 7 days   Lab Units 05/06/24  0548 05/05/24  0249   WBC AUTO x10*3/uL 6.6 9.0   HEMOGLOBIN g/dL 15.3 16.2   MCV fL 94 91   PLATELETS AUTO x10*3/uL 235 266   HEMATOCRIT % 47.1 47.6     Results from last 7 days   Lab Units 05/06/24  0548 05/05/24  1646 05/05/24  0249   SODIUM mmol/L 137 139 140   POTASSIUM mmol/L 4.1 4.2 3.4*   BUN mg/dL 16 17 18   CREATININE mg/dL 0.96 0.87 0.96   CHLORIDE mmol/L 101 102 101   MAGNESIUM mg/dL 1.92  --  2.09   PHOSPHORUS mg/dL 4.2  --   --      Results from last 7 days   Lab Units 05/06/24  0548 05/05/24  0721 05/05/24  0354   TROPHS ng/L 5 7 5   BNP pg/mL 82  --   --        Imaging:   Cardiac Catheterization Procedure     [facility], Cath Lab, [id number] [id number]    Cardiovascular Catheterization Report    Patient Name:      LOUISE FAY       Performing Physician:  [id number] Eljiah Aguillon MD  Study Date:        5/6/2024            Verifying Physician:   Evie Aguillon MD  MRN/PID:           [id number]            Cardiologist/Co-Scrub:  Accession#:        [id number]        Ordering Provider:     [id number] MENDOSA  Date of Birth/Age: 1960 / 63 years Cardiologist:  Gender:            M                   Fellow:  Encounter#:        [id number]          Surgeon:       Study: Left Heart Cath       Indications:  LOUISE FAY is a 64 year old male who presents with hypertension, dyslipidemia, obesity, diabetes, atrial fibrillation and a chest pain assessment of atypical angina. New onset angina <=2 months.     Procedure Description:  After  infiltration with 2% Lidocaine, the right radial artery was cannulated with a modified Seldinger technique. Subsequently a 6 Thai sheath was placed in the right radial artery. Selective coronary catheterization was performed using a 6 Fr catheter(s) exchanged over a guide wire to cannulate the coronary arteries.  Multiple injections of contrast were made into the left and right coronary arteries with angiograms recorded in multiple projections. After completion of the procedure, the arterial sheath was pulled and a TR Band Radial Compression Device was utilized to obtain patent hemostasis.     Coronary Angiography:  The coronary circulation is right dominant.     Left Main Coronary Artery:  The left main coronary artery showed no significant disease or stenosis greater than 30%.     Left Anterior Descending Coronary Artery Distribution:  The LAD demonstrated no significant disease or stenosis greater than 30%. The mid segment of the LAD demonstrated myocardial bridging with mild to moderate systolic compression.     Circumflex Coronary Artery Distribution:  The circumflex revealed no significant disease or stenosis greater than 30%.     Right Coronary Artery Distribution:    The RCA showed no significant disease or stenosis greater than 30%.     Hemo Personnel:  +-------------------+---------+  Name               Duty       +-------------------+---------+  Elijah Aguillon MDPEDUARDO MD 1  +-------------------+---------+       +----------+  Contrast:   +----------+  Omnipaque:  +----------+       Hemodynamic Pressures:     +----+------------------+---------+-------------+-------------+------+---------+  Site    Date Time       Phase  Systolic mmHg  Diastolic    ED  Mean mmHg                          Name                    mmHg      mmHg            +----+------------------+---------+-------------+-------------+------+---------+    SHAQ  5/6/2024 9:05:36 AIR REST           72           61              74                      AM                                                    +----+------------------+---------+-------------+-------------+------+---------+    LV  5/6/2024 9:09:00 AIR REST          104           -1     5                               AM                                                    +----+------------------+---------+-------------+-------------+------+---------+    LV  5/6/2024 9:09:09 AIR REST          102            1     5                               AM                                                    +----+------------------+---------+-------------+-------------+------+---------+   LVp  5/6/2024 9:09:18 AIR REST          100            0     4                               AM                                                    +----+------------------+---------+-------------+-------------+------+---------+   AOp  5/6/2024 9:09:25 AIR REST          112          -36             69                      AM                                                    +----+------------------+---------+-------------+-------------+------+---------+       Cardiac Cath Post Procedure Notes:  Post Procedure Diagnosis: No significant CAD, mild-moderate mid-LAD bridge.  Blood Loss:               Estimated blood loss during the procedure was 5cc mls.  Specimens Removed:        Number of specimen(s) removed: none.       Recommendations:  Maximize medical therapy.    ____________________________________________________________________________________  CONCLUSIONS:   1. No signficant angiographic disease in right dominant coronary circulation.   2. Mild-moderate mid-LAD myocardial bridge.   3. LVEDP 5mmHg, no aortic stenosis on LV-Ao gradient.    ICD 10 Codes:  Angina pectoris, unspecified-I20.9     CPT Codes:  Left Heart Cath (visualization of coronaries) and LV-29981; Moderate Sedation Services initial 15 minutes patient >5  "years-[id number]; Moderate Sedation Services 1st additional 15 minutes patient >5 years-[id number]     [id number] Elijah Aguillon MD  Performing Physician  Electronically signed by [id number] Elijah Aguillon MD on 5/6/2024 at 9:35:45 AM         ** Final **      Microbiology:   No results found for the last 90 days.                   No lab exists for component: \"AGALPCRNB\"   .ID  Lab Results   Component Value Date    BLOODCULT  06/09/2023     No Growth at 1 days~No Growth at 2 days~No Growth at 3 days~NO GROWTH at 4 days - FINAL REPORT       Medications:    Scheduled Medications:   [Held by provider] apixaban, 5 mg, oral, BID  aspirin, 81 mg, oral, Daily  atorvastatin, 80 mg, oral, Daily  atorvastatin, 80 mg, oral, Nightly  furosemide, 40 mg, oral, BID  insulin lispro, 0-10 Units, subcutaneous, TID with meals  lisinopril, 20 mg, oral, Daily  [Held by provider] metFORMIN, 500 mg, oral, BID with meals  metoprolol tartrate, 25 mg, oral, BID  [START ON 5/7/2024] pantoprazole, 40 mg, oral, Daily before breakfast  PARoxetine, 20 mg, oral, q AM  potassium chloride, 20 mEq, oral, BID  spironolactone, 25 mg, oral, Daily      Continuous Medications:      PRN Medications:   PRN medications: dextrose, dextrose, glucagon, glucagon, morphine, morphine, nitroglycerin     Assessment and Plan:      #Hypertensive urgency  -chest pressure in setting of elevated blood pressure  -[facility] without significant angiographic disease in right dominant coronary circulation and without aortic stenosis. Mild to mod mid-LAD myocardial bridge.  -repeat TTE with normal LVEF  [ ] recommend increase Lisinopril to 40 mg daily for tighter BP control  [ ] recommend PPI for potential GERD  [ ] ordered/scheduled zio patch to assess for A-fib burden  [ ] scheduled patient to follow-up with cardiology outpatient    ->okay for discharge from cardiology standpoint    Code status: Full Code     Valdo Mendez, DO  Internal Medicine PGY-1          "

## 2024-05-06 NOTE — PROGRESS NOTES
05/06/24 1334   Discharge Planning   Living Arrangements Family members   Support Systems Family members   Assistance Needed Alert and oriented x 3 Independent with ADL's, No DME, Drives, Room air   Type of Residence Private residence   Who is requesting discharge planning? Provider   Home or Post Acute Services None   Patient expects to be discharged to: Home with no discharge needs identified at this time   Does the patient need discharge transport arranged? No   Financial Resource Strain   How hard is it for you to pay for the very basics like food, housing, medical care, and heating? Not hard   Housing Stability   In the last 12 months, was there a time when you were not able to pay the mortgage or rent on time? N   In the last 12 months, how many places have you lived? 1   In the last 12 months, was there a time when you did not have a steady place to sleep or slept in a shelter (including now)? N   Transportation Needs   In the past 12 months, has lack of transportation kept you from medical appointments or from getting medications? no   In the past 12 months, has lack of transportation kept you from meetings, work, or from getting things needed for daily living? No   Patient Choice   Provider Choice list and CMS website (https://medicare.gov/care-compare#search) for post-acute Quality and Resource Measure Data were provided and reviewed with: Patient   Patient / Family choosing to utilize agency / facility established prior to hospitalization No

## 2024-05-07 LAB
AORTIC VALVE MEAN GRADIENT: 4.4 MMHG
AORTIC VALVE PEAK VELOCITY: 1.37 M/S
AV PEAK GRADIENT: 7.5 MMHG
AVA (PEAK VEL): 3.04 CM2
AVA (VTI): 2.85 CM2
EJECTION FRACTION APICAL 4 CHAMBER: 58.9
LEFT ATRIUM VOLUME AREA LENGTH INDEX BSA: 13.9 ML/M2
LEFT VENTRICLE INTERNAL DIMENSION DIASTOLE: 3.9 CM (ref 3.5–6)
LEFT VENTRICULAR OUTFLOW TRACT DIAMETER: 2.01 CM
LV EJECTION FRACTION BIPLANE: 62 %
MITRAL VALVE E/A RATIO: 0.73
MITRAL VALVE E/E' RATIO: 8.35
RIGHT VENTRICLE FREE WALL PEAK S': 18 CM/S
RIGHT VENTRICLE PEAK SYSTOLIC PRESSURE: 9.3 MMHG
TRICUSPID ANNULAR PLANE SYSTOLIC EXCURSION: 2.7 CM

## 2024-05-09 LAB
ATRIAL RATE: 63 BPM
ATRIAL RATE: 79 BPM
P AXIS: 25 DEGREES
P AXIS: 49 DEGREES
P OFFSET: 188 MS
P OFFSET: 189 MS
P ONSET: 126 MS
P ONSET: 128 MS
PR INTERVAL: 176 MS
PR INTERVAL: 178 MS
Q ONSET: 215 MS
Q ONSET: 216 MS
QRS COUNT: 10 BEATS
QRS COUNT: 13 BEATS
QRS DURATION: 84 MS
QRS DURATION: 86 MS
QT INTERVAL: 386 MS
QT INTERVAL: 400 MS
QTC CALCULATION(BAZETT): 409 MS
QTC CALCULATION(BAZETT): 442 MS
QTC FREDERICIA: 406 MS
QTC FREDERICIA: 423 MS
R AXIS: 53 DEGREES
R AXIS: 65 DEGREES
T AXIS: 52 DEGREES
T AXIS: 72 DEGREES
T OFFSET: 408 MS
T OFFSET: 416 MS
VENTRICULAR RATE: 63 BPM
VENTRICULAR RATE: 79 BPM

## 2024-05-16 ENCOUNTER — OFFICE VISIT (OUTPATIENT)
Dept: CARDIOLOGY | Facility: HOSPITAL | Age: 64
End: 2024-05-16
Payer: MEDICARE

## 2024-05-16 VITALS
OXYGEN SATURATION: 98 % | WEIGHT: 272.27 LBS | SYSTOLIC BLOOD PRESSURE: 136 MMHG | DIASTOLIC BLOOD PRESSURE: 82 MMHG | BODY MASS INDEX: 41.4 KG/M2 | HEART RATE: 73 BPM

## 2024-05-16 DIAGNOSIS — I10 ESSENTIAL HYPERTENSION: Primary | ICD-10-CM

## 2024-05-16 PROCEDURE — 3008F BODY MASS INDEX DOCD: CPT | Performed by: NURSE PRACTITIONER

## 2024-05-16 PROCEDURE — 99214 OFFICE O/P EST MOD 30 MIN: CPT | Performed by: NURSE PRACTITIONER

## 2024-05-16 PROCEDURE — 3079F DIAST BP 80-89 MM HG: CPT | Performed by: NURSE PRACTITIONER

## 2024-05-16 PROCEDURE — 3075F SYST BP GE 130 - 139MM HG: CPT | Performed by: NURSE PRACTITIONER

## 2024-05-16 RX ORDER — SPIRONOLACTONE 25 MG/1
25 TABLET ORAL DAILY
Qty: 90 TABLET | Refills: 3 | Status: SHIPPED | OUTPATIENT
Start: 2024-05-16 | End: 2025-05-16

## 2024-05-16 RX ORDER — METOPROLOL TARTRATE 50 MG/1
50 TABLET ORAL 2 TIMES DAILY
Qty: 180 TABLET | Refills: 3 | Status: SHIPPED | OUTPATIENT
Start: 2024-05-16 | End: 2025-05-16

## 2024-05-17 NOTE — PROGRESS NOTES
Chief Complaint:   Hospital follow up      History Of Present Illness:    Koko Winchester is a 63 y.o. 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  Troponin 4,5. BNP 82. CXR negative for acute pathology. Underwent LHC which was negative for obstructive CAD, did show mild to moderate mid LAD bridging.  Echocardiogram shows preserved EF, impaired relaxation, trace MR/TR. Lisinopril was increased from 20mg daily to 40mg daily.  He reports that chest pain does not occur often.   Usually occurs at night- wakes him from sleep.  He does have CAITLIN and is compliant with CPAP.  He is able to go disc golfing-- walks around course without chest pain or SOB.  States he had one mild event of chest pressure that occurred at rest since he was hospitalized-- feels like a tightness in his upper chest.  He took ibuprofen which seemed to help relieve symptoms.  Was advised to get zio monitor at discharge as  well-- currently arranged for 5/20/2024      Last Recorded Vitals:  Vitals:    05/16/24 1257   BP: 136/82   Pulse: 73   SpO2: 98%   Weight: 124 kg (272 lb 4.3 oz)       Past Medical History:  He has no past medical history on file.    Past Surgical History:  He has a past surgical history that includes Cardiac catheterization (N/A, 5/6/2024).      Social History:  He reports that he has never smoked. He has never used smokeless tobacco. He reports that he does not drink alcohol and does not use drugs.    Family History:  No family history on file.     Allergies:  Keflex [cephalexin]    Outpatient Medications:  Current Outpatient Medications   Medication Instructions    apixaban (ELIQUIS) 5 mg, oral, 2 times daily    aspirin 81 mg, oral, Daily    atorvastatin (LIPITOR) 80 mg, oral, Nightly     furosemide (LASIX) 40 mg, oral, 2 times daily    lisinopril 40 mg, oral, Daily    metFORMIN (GLUCOPHAGE) 500 mg, oral, 2 times daily (morning and late afternoon)    metoprolol tartrate (LOPRESSOR) 50 mg, oral, 2 times daily    pantoprazole (PROTONIX) 40 mg, oral, Daily before breakfast, Do not crush, chew, or split.    PARoxetine (PAXIL) 20 mg, oral, Every morning    spironolactone (ALDACTONE) 25 mg, oral, Daily       Physical Exam:  Constitutional: Pleasant, Awake/Alert/Oriented to person place and time. No distress  Head: Atraumatic, Normocephalic  Eyes: EOMI. PAULINE  Neck: No JVD  Cardiovascular: Regular rate and rhythm, S1, S2. No extra heart sounds or murmurs  Respiratory: Clear to auscultation bilaterally. No wheezing, rales or rhonchi. Good chest wall expansion  Abdomen: Soft, Nontender. Bowel sounds appreciated  Musculoskeletal: ROM intact. Muscle strength grossly intact upper and lower extremities 5/5.   Neurological: CNII-XII intact. Sensation grossly intact  Extremities: Warm and dry. No acute rashes and lesions  Psychiatric: Appropriate mood and affect       Last Labs:  CBC -  Lab Results   Component Value Date    WBC 6.6 05/06/2024    HGB 15.3 05/06/2024    HCT 47.1 05/06/2024    MCV 94 05/06/2024     05/06/2024       CMP -  Lab Results   Component Value Date    CALCIUM 9.1 05/06/2024    PHOS 4.2 05/06/2024    PROT 8.4 (H) 05/05/2024    ALBUMIN 4.2 05/06/2024    AST 19 05/05/2024    ALT 14 05/05/2024    ALKPHOS 103 05/05/2024    BILITOT 1.0 05/05/2024       LIPID PANEL -   Lab Results   Component Value Date    CHOL 115 05/05/2024    TRIG 43 05/05/2024    HDL 42.0 05/05/2024    CHHDL 2.7 05/05/2024    LDLF 48 02/07/2023    VLDL 9 05/05/2024    NHDL 73 05/05/2024       RENAL FUNCTION PANEL -   Lab Results   Component Value Date    GLUCOSE 96 05/06/2024     05/06/2024    K 4.1 05/06/2024     05/06/2024    CO2 29 05/06/2024    ANIONGAP 11 05/06/2024    BUN 16 05/06/2024    CREATININE 0.96  05/06/2024    GFRMALE >90 06/12/2023    CALCIUM 9.1 05/06/2024    PHOS 4.2 05/06/2024    ALBUMIN 4.2 05/06/2024        Lab Results   Component Value Date    BNP 82 05/06/2024    HGBA1C 5.2 02/07/2023       Last Cardiology Tests:  Echo:  5/6/2024:  CONCLUSIONS:   1. Left ventricular systolic function is normal with a 60-65% estimated ejection fraction.   2. No left ventricular thrombus visualized.   3. Spectral Doppler shows an impaired relaxation pattern of left ventricular diastolic filling.    6/12/2023  CONCLUSIONS:  1. Left ventricular systolic function is mildly decreased.  2. The right atrium is mild to moderately dilated.  3. There is global hypokinesis of the left ventricle with minor regional variations.     2/7/2023  CONCLUSIONS:   1. Left ventricular systolic function is normal with a 55-60% estimated ejection fraction.   2. Spectral Doppler shows an impaired relaxation pattern of left ventricular diastolic filling.   3. There is trace-mild mitral, tricuspid and pulmonic regurgitation.   4. The estimated pulmonary artery pressure is mildly elevated with the RVSP at 37.2 mmHg.    Cath:  Cardiac Catheterization Procedure 05/06/2024    CONCLUSIONS:   1. No signficant angiographic disease in right dominant coronary circulation.   2. Mild-moderate mid-LAD myocardial bridge.   3. LVEDP 5mmHg, no aortic stenosis on LV-Ao gradient.      Lab review: I have personally reviewed the laboratory result(s)   Diagnostic review: I have personally reviewed the result(s) of the Echocardiogram, LHC.     Assessment/Plan   Very pleasant 63 y.o. 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  LHC negative for obstructive CAD, shows mild to moderate mid LAD bridging. Echo shows preserved EF, no  significant VHD.     Plan:  -Increase metoprolol tartrate to 50mg BID-- LAD bridging is mild to moderate and likely non-contributory to symptoms, however, may be causing increased demand in the setting of hypertension.    -Continue lisinopril 40mg daily, lasix 40mg BID, spironolactone 25mg daily, aspirin 81mg daily, atorvastatin 80mg daily, apixaban, 5mg BID.   -Referral placed for Registered Dietician consult per patient request.   -Follow up in 6 months or sooner if needed       LYNDSEY Hahn-CNP

## 2024-05-20 ENCOUNTER — APPOINTMENT (OUTPATIENT)
Dept: CARDIOLOGY | Facility: HOSPITAL | Age: 64
End: 2024-05-20
Payer: MEDICARE

## 2024-06-10 LAB
ATRIAL RATE: 73 BPM
P AXIS: 34 DEGREES
P OFFSET: 188 MS
P ONSET: 138 MS
PR INTERVAL: 158 MS
Q ONSET: 217 MS
QRS COUNT: 12 BEATS
QRS DURATION: 84 MS
QT INTERVAL: 404 MS
QTC CALCULATION(BAZETT): 445 MS
QTC FREDERICIA: 431 MS
R AXIS: 46 DEGREES
T AXIS: 47 DEGREES
T OFFSET: 419 MS
VENTRICULAR RATE: 73 BPM

## 2024-07-02 ENCOUNTER — APPOINTMENT (OUTPATIENT)
Dept: CARDIOLOGY | Facility: HOSPITAL | Age: 64
End: 2024-07-02
Payer: MEDICARE

## 2024-08-08 ENCOUNTER — APPOINTMENT (OUTPATIENT)
Dept: CARDIOLOGY | Facility: HOSPITAL | Age: 64
End: 2024-08-08
Payer: MEDICARE

## (undated) DEVICE — MANIFOLD KIT, CUSTOM, GEAUGA

## (undated) DEVICE — CATHETER, ANGIO, IMPULSE, FR4, 6 FR X 100 CM

## (undated) DEVICE — ANGIO KIT, LEFT HEART, LF, CUSTOM

## (undated) DEVICE — NEEDLE, ENTRY, PERCUTANEOUS, 21 G X 2.5 CM

## (undated) DEVICE — HAND CONTROL KIT, PREMIUM, AT-P65

## (undated) DEVICE — TUBING, MANIFOLD, LOW PRESSURE

## (undated) DEVICE — INTRODUCER SHEATH, GLIDESHEATH, 6FR 10CM

## (undated) DEVICE — SYRINGE, ANGIOGRAPHIC, MULTIDOSE

## (undated) DEVICE — GUIDEWIRE, INQUIRE, J TIP, .035 X 210CM, FIXED CORE, DIAGNOSTIC

## (undated) DEVICE — CATHETER, ANGIO, IMPULSE, FL3.5, 6 FR X 100 CM

## (undated) DEVICE — BAND, VASCULAR, RADIAL HEMOSTAT, LONG 27CM